# Patient Record
Sex: FEMALE | Race: WHITE | NOT HISPANIC OR LATINO | Employment: OTHER | URBAN - METROPOLITAN AREA
[De-identification: names, ages, dates, MRNs, and addresses within clinical notes are randomized per-mention and may not be internally consistent; named-entity substitution may affect disease eponyms.]

---

## 2017-01-01 ENCOUNTER — HOME CARE VISIT (OUTPATIENT)
Dept: HOME HEALTH SERVICES | Facility: HOME HEALTHCARE | Age: 82
End: 2017-01-01
Payer: MEDICARE

## 2017-01-01 ENCOUNTER — HOME HEALTH ADMISSION (OUTPATIENT)
Dept: HOME HEALTH SERVICES | Facility: HOME HEALTHCARE | Age: 82
End: 2017-01-01
Payer: MEDICARE

## 2017-01-01 ENCOUNTER — HOSPICE ADMISSION (OUTPATIENT)
Dept: HOSPICE | Facility: HOSPICE | Age: 82
End: 2017-01-01
Payer: MEDICARE

## 2017-01-01 ENCOUNTER — HOME CARE VISIT (OUTPATIENT)
Dept: HOSPICE | Facility: HOSPICE | Age: 82
End: 2017-01-01
Payer: MEDICARE

## 2017-01-01 ENCOUNTER — APPOINTMENT (OUTPATIENT)
Dept: RADIOLOGY | Facility: MEDICAL CENTER | Age: 82
DRG: 871 | End: 2017-01-01
Attending: INTERNAL MEDICINE
Payer: MEDICARE

## 2017-01-01 ENCOUNTER — HOSPITAL ENCOUNTER (INPATIENT)
Facility: MEDICAL CENTER | Age: 82
LOS: 1 days | DRG: 871 | End: 2017-07-22
Attending: EMERGENCY MEDICINE | Admitting: INTERNAL MEDICINE
Payer: MEDICARE

## 2017-01-01 ENCOUNTER — APPOINTMENT (OUTPATIENT)
Dept: RADIOLOGY | Facility: MEDICAL CENTER | Age: 82
End: 2017-01-01
Attending: EMERGENCY MEDICINE
Payer: MEDICARE

## 2017-01-01 ENCOUNTER — APPOINTMENT (OUTPATIENT)
Dept: RADIOLOGY | Facility: MEDICAL CENTER | Age: 82
DRG: 871 | End: 2017-01-01
Attending: EMERGENCY MEDICINE
Payer: MEDICARE

## 2017-01-01 ENCOUNTER — RESOLUTE PROFESSIONAL BILLING HOSPITAL PROF FEE (OUTPATIENT)
Dept: HOSPITALIST | Facility: MEDICAL CENTER | Age: 82
End: 2017-01-01
Payer: MEDICARE

## 2017-01-01 ENCOUNTER — HOSPITAL ENCOUNTER (OUTPATIENT)
Facility: MEDICAL CENTER | Age: 82
End: 2017-04-13
Attending: EMERGENCY MEDICINE | Admitting: INTERNAL MEDICINE
Payer: MEDICARE

## 2017-01-01 VITALS
RESPIRATION RATE: 18 BRPM | SYSTOLIC BLOOD PRESSURE: 100 MMHG | HEART RATE: 63 BPM | DIASTOLIC BLOOD PRESSURE: 68 MMHG | TEMPERATURE: 97.7 F

## 2017-01-01 VITALS
RESPIRATION RATE: 18 BRPM | TEMPERATURE: 97.9 F | HEART RATE: 65 BPM | SYSTOLIC BLOOD PRESSURE: 102 MMHG | DIASTOLIC BLOOD PRESSURE: 62 MMHG

## 2017-01-01 VITALS
DIASTOLIC BLOOD PRESSURE: 61 MMHG | HEART RATE: 65 BPM | SYSTOLIC BLOOD PRESSURE: 96 MMHG | TEMPERATURE: 208.4 F | RESPIRATION RATE: 18 BRPM

## 2017-01-01 VITALS
TEMPERATURE: 206.6 F | RESPIRATION RATE: 18 BRPM | DIASTOLIC BLOOD PRESSURE: 67 MMHG | HEART RATE: 66 BPM | SYSTOLIC BLOOD PRESSURE: 120 MMHG

## 2017-01-01 VITALS
TEMPERATURE: 98.2 F | DIASTOLIC BLOOD PRESSURE: 64 MMHG | HEART RATE: 64 BPM | RESPIRATION RATE: 20 BRPM | SYSTOLIC BLOOD PRESSURE: 118 MMHG

## 2017-01-01 VITALS
TEMPERATURE: 97.9 F | HEART RATE: 88 BPM | RESPIRATION RATE: 18 BRPM | SYSTOLIC BLOOD PRESSURE: 110 MMHG | DIASTOLIC BLOOD PRESSURE: 70 MMHG

## 2017-01-01 VITALS
HEIGHT: 62 IN | HEART RATE: 91 BPM | BODY MASS INDEX: 19.51 KG/M2 | OXYGEN SATURATION: 98 % | RESPIRATION RATE: 22 BRPM | TEMPERATURE: 98.2 F | SYSTOLIC BLOOD PRESSURE: 97 MMHG | WEIGHT: 106 LBS | DIASTOLIC BLOOD PRESSURE: 44 MMHG

## 2017-01-01 VITALS
HEART RATE: 67 BPM | RESPIRATION RATE: 18 BRPM | DIASTOLIC BLOOD PRESSURE: 70 MMHG | SYSTOLIC BLOOD PRESSURE: 105 MMHG | TEMPERATURE: 97.5 F

## 2017-01-01 VITALS
RESPIRATION RATE: 16 BRPM | HEART RATE: 61 BPM | DIASTOLIC BLOOD PRESSURE: 60 MMHG | SYSTOLIC BLOOD PRESSURE: 112 MMHG | TEMPERATURE: 97.5 F

## 2017-01-01 VITALS — TEMPERATURE: 209.8 F | HEART RATE: 70 BPM | RESPIRATION RATE: 16 BRPM

## 2017-01-01 VITALS
WEIGHT: 106.92 LBS | DIASTOLIC BLOOD PRESSURE: 39 MMHG | TEMPERATURE: 98.4 F | OXYGEN SATURATION: 96 % | SYSTOLIC BLOOD PRESSURE: 111 MMHG | HEIGHT: 62 IN | RESPIRATION RATE: 16 BRPM | HEART RATE: 66 BPM | BODY MASS INDEX: 19.68 KG/M2

## 2017-01-01 VITALS
SYSTOLIC BLOOD PRESSURE: 106 MMHG | RESPIRATION RATE: 16 BRPM | TEMPERATURE: 97.7 F | HEART RATE: 64 BPM | DIASTOLIC BLOOD PRESSURE: 62 MMHG

## 2017-01-01 VITALS
HEART RATE: 64 BPM | DIASTOLIC BLOOD PRESSURE: 65 MMHG | RESPIRATION RATE: 18 BRPM | SYSTOLIC BLOOD PRESSURE: 110 MMHG | TEMPERATURE: 97.5 F

## 2017-01-01 VITALS
HEART RATE: 64 BPM | SYSTOLIC BLOOD PRESSURE: 105 MMHG | TEMPERATURE: 97.7 F | RESPIRATION RATE: 18 BRPM | DIASTOLIC BLOOD PRESSURE: 65 MMHG

## 2017-01-01 VITALS
DIASTOLIC BLOOD PRESSURE: 60 MMHG | TEMPERATURE: 97.8 F | RESPIRATION RATE: 18 BRPM | HEART RATE: 94 BPM | SYSTOLIC BLOOD PRESSURE: 110 MMHG

## 2017-01-01 VITALS
RESPIRATION RATE: 16 BRPM | SYSTOLIC BLOOD PRESSURE: 106 MMHG | TEMPERATURE: 97.5 F | DIASTOLIC BLOOD PRESSURE: 60 MMHG | HEART RATE: 59 BPM

## 2017-01-01 VITALS
TEMPERATURE: 97.7 F | DIASTOLIC BLOOD PRESSURE: 70 MMHG | HEART RATE: 88 BPM | RESPIRATION RATE: 18 BRPM | SYSTOLIC BLOOD PRESSURE: 115 MMHG

## 2017-01-01 VITALS
DIASTOLIC BLOOD PRESSURE: 58 MMHG | SYSTOLIC BLOOD PRESSURE: 110 MMHG | HEIGHT: 62 IN | RESPIRATION RATE: 20 BRPM | TEMPERATURE: 98.3 F

## 2017-01-01 VITALS
RESPIRATION RATE: 16 BRPM | HEART RATE: 64 BPM | DIASTOLIC BLOOD PRESSURE: 60 MMHG | TEMPERATURE: 98.1 F | SYSTOLIC BLOOD PRESSURE: 110 MMHG

## 2017-01-01 VITALS
DIASTOLIC BLOOD PRESSURE: 70 MMHG | TEMPERATURE: 97.7 F | HEART RATE: 81 BPM | RESPIRATION RATE: 18 BRPM | SYSTOLIC BLOOD PRESSURE: 118 MMHG

## 2017-01-01 VITALS
RESPIRATION RATE: 18 BRPM | SYSTOLIC BLOOD PRESSURE: 120 MMHG | DIASTOLIC BLOOD PRESSURE: 65 MMHG | HEART RATE: 66 BPM | TEMPERATURE: 97.5 F

## 2017-01-01 DIAGNOSIS — R62.7 FAILURE TO THRIVE IN ADULT: ICD-10-CM

## 2017-01-01 DIAGNOSIS — R53.1 WEAKNESS: ICD-10-CM

## 2017-01-01 DIAGNOSIS — R05.9 COUGH: ICD-10-CM

## 2017-01-01 DIAGNOSIS — N28.9 RENAL INSUFFICIENCY: ICD-10-CM

## 2017-01-01 DIAGNOSIS — F03.C0 ADVANCED DEMENTIA (HCC): ICD-10-CM

## 2017-01-01 DIAGNOSIS — R09.02 HYPOXIA: ICD-10-CM
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
## 2017-01-01 DIAGNOSIS — R55 NEAR SYNCOPE: ICD-10-CM

## 2017-01-01 LAB
ALBUMIN SERPL BCP-MCNC: 3.1 G/DL (ref 3.2–4.9)
ALBUMIN SERPL BCP-MCNC: 4 G/DL (ref 3.2–4.9)
ALBUMIN/GLOB SERPL: 1.3 G/DL
ALBUMIN/GLOB SERPL: 1.3 G/DL
ALP SERPL-CCNC: 38 U/L (ref 30–99)
ALP SERPL-CCNC: 48 U/L (ref 30–99)
ALT SERPL-CCNC: 10 U/L (ref 2–50)
ALT SERPL-CCNC: 17 U/L (ref 2–50)
ANION GAP SERPL CALC-SCNC: 10 MMOL/L (ref 0–11.9)
ANION GAP SERPL CALC-SCNC: 14 MMOL/L (ref 0–11.9)
ANION GAP SERPL CALC-SCNC: 6 MMOL/L (ref 0–11.9)
ANION GAP SERPL CALC-SCNC: 7 MMOL/L (ref 0–11.9)
ANISOCYTOSIS BLD QL SMEAR: ABNORMAL
APPEARANCE UR: CLEAR
APTT PPP: 24 SEC (ref 24.7–36)
AST SERPL-CCNC: 16 U/L (ref 12–45)
AST SERPL-CCNC: 34 U/L (ref 12–45)
BASOPHILS # BLD AUTO: 0 % (ref 0–1.8)
BASOPHILS # BLD AUTO: 0.2 % (ref 0–1.8)
BASOPHILS # BLD AUTO: 0.8 % (ref 0–1.8)
BASOPHILS # BLD AUTO: 0.9 % (ref 0–1.8)
BASOPHILS # BLD: 0 K/UL (ref 0–0.12)
BASOPHILS # BLD: 0.03 K/UL (ref 0–0.12)
BASOPHILS # BLD: 0.06 K/UL (ref 0–0.12)
BASOPHILS # BLD: 0.07 K/UL (ref 0–0.12)
BILIRUB SERPL-MCNC: 0.6 MG/DL (ref 0.1–1.5)
BILIRUB SERPL-MCNC: 1.1 MG/DL (ref 0.1–1.5)
BILIRUB UR QL STRIP.AUTO: NEGATIVE
BNP SERPL-MCNC: 140 PG/ML (ref 0–100)
BUN SERPL-MCNC: 16 MG/DL (ref 8–22)
BUN SERPL-MCNC: 29 MG/DL (ref 8–22)
BUN SERPL-MCNC: 39 MG/DL (ref 8–22)
BUN SERPL-MCNC: 52 MG/DL (ref 8–22)
CALCIUM SERPL-MCNC: 7.5 MG/DL (ref 8.4–10.2)
CALCIUM SERPL-MCNC: 8.3 MG/DL (ref 8.4–10.2)
CALCIUM SERPL-MCNC: 8.8 MG/DL (ref 8.4–10.2)
CALCIUM SERPL-MCNC: 9.7 MG/DL (ref 8.4–10.2)
CHLORIDE SERPL-SCNC: 104 MMOL/L (ref 96–112)
CHLORIDE SERPL-SCNC: 110 MMOL/L (ref 96–112)
CHLORIDE SERPL-SCNC: 114 MMOL/L (ref 96–112)
CHLORIDE SERPL-SCNC: 116 MMOL/L (ref 96–112)
CO2 SERPL-SCNC: 22 MMOL/L (ref 20–33)
CO2 SERPL-SCNC: 26 MMOL/L (ref 20–33)
CO2 SERPL-SCNC: 27 MMOL/L (ref 20–33)
CO2 SERPL-SCNC: 29 MMOL/L (ref 20–33)
COLOR UR: YELLOW
CORTIS SERPL-MCNC: 16.8 UG/DL (ref 0–23)
CREAT SERPL-MCNC: 0.9 MG/DL (ref 0.5–1.4)
CREAT SERPL-MCNC: 1.23 MG/DL (ref 0.5–1.4)
CREAT SERPL-MCNC: 1.82 MG/DL (ref 0.5–1.4)
CREAT SERPL-MCNC: 2.37 MG/DL (ref 0.5–1.4)
CULTURE IF INDICATED INDCX: NO UA CULTURE
EKG IMPRESSION: NORMAL
EKG IMPRESSION: NORMAL
EOSINOPHIL # BLD AUTO: 0 K/UL (ref 0–0.51)
EOSINOPHIL # BLD AUTO: 0 K/UL (ref 0–0.51)
EOSINOPHIL # BLD AUTO: 0.23 K/UL (ref 0–0.51)
EOSINOPHIL # BLD AUTO: 0.24 K/UL (ref 0–0.51)
EOSINOPHIL NFR BLD: 0 % (ref 0–6.9)
EOSINOPHIL NFR BLD: 0 % (ref 0–6.9)
EOSINOPHIL NFR BLD: 2.8 % (ref 0–6.9)
EOSINOPHIL NFR BLD: 3.3 % (ref 0–6.9)
ERYTHROCYTE [DISTWIDTH] IN BLOOD BY AUTOMATED COUNT: 48 FL (ref 35.9–50)
ERYTHROCYTE [DISTWIDTH] IN BLOOD BY AUTOMATED COUNT: 49 FL (ref 35.9–50)
ERYTHROCYTE [DISTWIDTH] IN BLOOD BY AUTOMATED COUNT: 50.3 FL (ref 35.9–50)
ERYTHROCYTE [DISTWIDTH] IN BLOOD BY AUTOMATED COUNT: 52.3 FL (ref 35.9–50)
ERYTHROCYTE [SEDIMENTATION RATE] IN BLOOD BY WESTERGREN METHOD: 18 MM/HOUR (ref 0–30)
GFR SERPL CREATININE-BSD FRML MDRD: 19 ML/MIN/1.73 M 2
GFR SERPL CREATININE-BSD FRML MDRD: 26 ML/MIN/1.73 M 2
GFR SERPL CREATININE-BSD FRML MDRD: 41 ML/MIN/1.73 M 2
GFR SERPL CREATININE-BSD FRML MDRD: 58 ML/MIN/1.73 M 2
GLOBULIN SER CALC-MCNC: 2.3 G/DL (ref 1.9–3.5)
GLOBULIN SER CALC-MCNC: 3.1 G/DL (ref 1.9–3.5)
GLUCOSE BLD-MCNC: 92 MG/DL (ref 65–99)
GLUCOSE BLD-MCNC: 97 MG/DL (ref 65–99)
GLUCOSE SERPL-MCNC: 114 MG/DL (ref 65–99)
GLUCOSE SERPL-MCNC: 173 MG/DL (ref 65–99)
GLUCOSE SERPL-MCNC: 82 MG/DL (ref 65–99)
GLUCOSE SERPL-MCNC: 92 MG/DL (ref 65–99)
GLUCOSE UR STRIP.AUTO-MCNC: NEGATIVE MG/DL
GRAM STN SPEC: NORMAL
HCT VFR BLD AUTO: 36.4 % (ref 37–47)
HCT VFR BLD AUTO: 41.7 % (ref 37–47)
HCT VFR BLD AUTO: 42.4 % (ref 37–47)
HCT VFR BLD AUTO: 44.7 % (ref 37–47)
HGB BLD-MCNC: 11.2 G/DL (ref 12–16)
HGB BLD-MCNC: 12.9 G/DL (ref 12–16)
HGB BLD-MCNC: 13.3 G/DL (ref 12–16)
HGB BLD-MCNC: 14.4 G/DL (ref 12–16)
IMM GRANULOCYTES # BLD AUTO: 0.02 K/UL (ref 0–0.11)
IMM GRANULOCYTES # BLD AUTO: 0.03 K/UL (ref 0–0.11)
IMM GRANULOCYTES # BLD AUTO: 0.05 K/UL (ref 0–0.11)
IMM GRANULOCYTES NFR BLD AUTO: 0.3 % (ref 0–0.9)
IMM GRANULOCYTES NFR BLD AUTO: 0.3 % (ref 0–0.9)
IMM GRANULOCYTES NFR BLD AUTO: 0.4 % (ref 0–0.9)
INR PPP: 0.96 (ref 0.87–1.13)
KETONES UR STRIP.AUTO-MCNC: NEGATIVE MG/DL
LACTATE BLD-SCNC: 2.66 MMOL/L (ref 0.5–2)
LACTATE BLD-SCNC: 3.11 MMOL/L (ref 0.5–2)
LACTATE BLD-SCNC: 4.64 MMOL/L (ref 0.5–2)
LACTATE BLD-SCNC: 5.33 MMOL/L (ref 0.5–2)
LEUKOCYTE ESTERASE UR QL STRIP.AUTO: NEGATIVE
LG PLATELETS BLD QL SMEAR: NORMAL
LV EJECT FRACT  99904: 65
LV EJECT FRACT MOD 2C 99903: 58.2
LV EJECT FRACT MOD 4C 99902: 65.03
LV EJECT FRACT MOD BP 99901: 63.59
LYMPHOCYTES # BLD AUTO: 0.69 K/UL (ref 1–4.8)
LYMPHOCYTES # BLD AUTO: 0.8 K/UL (ref 1–4.8)
LYMPHOCYTES # BLD AUTO: 1.34 K/UL (ref 1–4.8)
LYMPHOCYTES # BLD AUTO: 1.48 K/UL (ref 1–4.8)
LYMPHOCYTES NFR BLD: 12 % (ref 22–41)
LYMPHOCYTES NFR BLD: 18.3 % (ref 22–41)
LYMPHOCYTES NFR BLD: 18.4 % (ref 22–41)
LYMPHOCYTES NFR BLD: 4.2 % (ref 22–41)
MACROCYTES BLD QL SMEAR: ABNORMAL
MAGNESIUM SERPL-MCNC: 1.8 MG/DL (ref 1.5–2.5)
MANUAL DIFF BLD: ABNORMAL
MCH RBC QN AUTO: 30.8 PG (ref 27–33)
MCH RBC QN AUTO: 31 PG (ref 27–33)
MCH RBC QN AUTO: 31.1 PG (ref 27–33)
MCH RBC QN AUTO: 31.1 PG (ref 27–33)
MCHC RBC AUTO-ENTMCNC: 30.8 G/DL (ref 33.6–35)
MCHC RBC AUTO-ENTMCNC: 30.9 G/DL (ref 33.6–35)
MCHC RBC AUTO-ENTMCNC: 31.4 G/DL (ref 33.6–35)
MCHC RBC AUTO-ENTMCNC: 32.2 G/DL (ref 33.6–35)
MCV RBC AUTO: 100.5 FL (ref 81.4–97.8)
MCV RBC AUTO: 100.8 FL (ref 81.4–97.8)
MCV RBC AUTO: 95.5 FL (ref 81.4–97.8)
MCV RBC AUTO: 99.3 FL (ref 81.4–97.8)
METAMYELOCYTES NFR BLD MANUAL: 9 %
MICRO URNS: NORMAL
MONOCYTES # BLD AUTO: 0.27 K/UL (ref 0–0.85)
MONOCYTES # BLD AUTO: 0.61 K/UL (ref 0–0.85)
MONOCYTES # BLD AUTO: 0.67 K/UL (ref 0–0.85)
MONOCYTES # BLD AUTO: 0.73 K/UL (ref 0–0.85)
MONOCYTES NFR BLD AUTO: 4 % (ref 0–13.4)
MONOCYTES NFR BLD AUTO: 4.4 % (ref 0–13.4)
MONOCYTES NFR BLD AUTO: 8.3 % (ref 0–13.4)
MONOCYTES NFR BLD AUTO: 8.4 % (ref 0–13.4)
NEUTROPHILS # BLD AUTO: 14.94 K/UL (ref 2–7.15)
NEUTROPHILS # BLD AUTO: 5.03 K/UL (ref 2–7.15)
NEUTROPHILS # BLD AUTO: 5.03 K/UL (ref 2–7.15)
NEUTROPHILS # BLD AUTO: 5.62 K/UL (ref 2–7.15)
NEUTROPHILS NFR BLD: 48 % (ref 44–72)
NEUTROPHILS NFR BLD: 68.8 % (ref 44–72)
NEUTROPHILS NFR BLD: 69.3 % (ref 44–72)
NEUTROPHILS NFR BLD: 90.9 % (ref 44–72)
NEUTS BAND NFR BLD MANUAL: 27 % (ref 0–10)
NITRITE UR QL STRIP.AUTO: NEGATIVE
NRBC # BLD AUTO: 0 K/UL
NRBC BLD AUTO-RTO: 0 /100 WBC
PH UR STRIP.AUTO: 6 [PH]
PLATELET # BLD AUTO: 155 K/UL (ref 164–446)
PLATELET # BLD AUTO: 186 K/UL (ref 164–446)
PLATELET # BLD AUTO: 256 K/UL (ref 164–446)
PLATELET # BLD AUTO: ABNORMAL K/UL (ref 164–446)
PLATELET BLD QL SMEAR: NORMAL
PMV BLD AUTO: 12.2 FL (ref 9–12.9)
PMV BLD AUTO: 12.7 FL (ref 9–12.9)
PMV BLD AUTO: 13.1 FL (ref 9–12.9)
PMV BLD AUTO: ABNORMAL FL (ref 9–12.9)
POTASSIUM SERPL-SCNC: 4 MMOL/L (ref 3.6–5.5)
POTASSIUM SERPL-SCNC: 4.1 MMOL/L (ref 3.6–5.5)
POTASSIUM SERPL-SCNC: 4.8 MMOL/L (ref 3.6–5.5)
POTASSIUM SERPL-SCNC: 4.9 MMOL/L (ref 3.6–5.5)
PROCALCITONIN SERPL-MCNC: 0.18 NG/ML
PROT SERPL-MCNC: 5.4 G/DL (ref 6–8.2)
PROT SERPL-MCNC: 7.1 G/DL (ref 6–8.2)
PROT UR QL STRIP: NEGATIVE MG/DL
PROTHROMBIN TIME: 12.6 SEC (ref 12–14.6)
RBC # BLD AUTO: 3.61 M/UL (ref 4.2–5.4)
RBC # BLD AUTO: 4.15 M/UL (ref 4.2–5.4)
RBC # BLD AUTO: 4.27 M/UL (ref 4.2–5.4)
RBC # BLD AUTO: 4.68 M/UL (ref 4.2–5.4)
RBC BLD AUTO: PRESENT
RBC UR QL AUTO: NEGATIVE
SIGNIFICANT IND 70042: NORMAL
SITE SITE: NORMAL
SODIUM SERPL-SCNC: 143 MMOL/L (ref 135–145)
SODIUM SERPL-SCNC: 144 MMOL/L (ref 135–145)
SODIUM SERPL-SCNC: 147 MMOL/L (ref 135–145)
SODIUM SERPL-SCNC: 151 MMOL/L (ref 135–145)
SOURCE SOURCE: NORMAL
SP GR UR STRIP.AUTO: 1.01
SPECIMEN DRAWN FROM PATIENT: ABNORMAL
TROPONIN I SERPL-MCNC: 0.04 NG/ML (ref 0–0.04)
TROPONIN I SERPL-MCNC: <0.02 NG/ML (ref 0–0.04)
TSH SERPL DL<=0.005 MIU/L-ACNC: 2.92 UIU/ML (ref 0.35–5.5)
VARIANT LYMPHS BLD QL SMEAR: NORMAL
WBC # BLD AUTO: 16.4 K/UL (ref 4.8–10.8)
WBC # BLD AUTO: 6.7 K/UL (ref 4.8–10.8)
WBC # BLD AUTO: 7.3 K/UL (ref 4.8–10.8)
WBC # BLD AUTO: 8.1 K/UL (ref 4.8–10.8)

## 2017-01-01 PROCEDURE — 665998 HH PPS REVENUE CREDIT

## 2017-01-01 PROCEDURE — 93005 ELECTROCARDIOGRAM TRACING: CPT | Performed by: EMERGENCY MEDICINE

## 2017-01-01 PROCEDURE — 700105 HCHG RX REV CODE 258: Performed by: INTERNAL MEDICINE

## 2017-01-01 PROCEDURE — G0152 HHCP-SERV OF OT,EA 15 MIN: HCPCS

## 2017-01-01 PROCEDURE — 93306 TTE W/DOPPLER COMPLETE: CPT | Mod: 26 | Performed by: INTERNAL MEDICINE

## 2017-01-01 PROCEDURE — 93005 ELECTROCARDIOGRAM TRACING: CPT | Performed by: INTERNAL MEDICINE

## 2017-01-01 PROCEDURE — A9270 NON-COVERED ITEM OR SERVICE: HCPCS | Performed by: INTERNAL MEDICINE

## 2017-01-01 PROCEDURE — 87040 BLOOD CULTURE FOR BACTERIA: CPT

## 2017-01-01 PROCEDURE — G0378 HOSPITAL OBSERVATION PER HR: HCPCS

## 2017-01-01 PROCEDURE — 700102 HCHG RX REV CODE 250 W/ 637 OVERRIDE(OP): Performed by: INTERNAL MEDICINE

## 2017-01-01 PROCEDURE — 665999 HH PPS REVENUE DEBIT

## 2017-01-01 PROCEDURE — 700102 HCHG RX REV CODE 250 W/ 637 OVERRIDE(OP): Performed by: HOSPITALIST

## 2017-01-01 PROCEDURE — 99291 CRITICAL CARE FIRST HOUR: CPT | Performed by: HOSPITALIST

## 2017-01-01 PROCEDURE — 97530 THERAPEUTIC ACTIVITIES: CPT

## 2017-01-01 PROCEDURE — 82962 GLUCOSE BLOOD TEST: CPT

## 2017-01-01 PROCEDURE — G0151 HHCP-SERV OF PT,EA 15 MIN: HCPCS

## 2017-01-01 PROCEDURE — 96367 TX/PROPH/DG ADDL SEQ IV INF: CPT

## 2017-01-01 PROCEDURE — 700105 HCHG RX REV CODE 258: Performed by: EMERGENCY MEDICINE

## 2017-01-01 PROCEDURE — 87070 CULTURE OTHR SPECIMN AEROBIC: CPT

## 2017-01-01 PROCEDURE — G0493 RN CARE EA 15 MIN HH/HOSPICE: HCPCS

## 2017-01-01 PROCEDURE — 97161 PT EVAL LOW COMPLEX 20 MIN: CPT

## 2017-01-01 PROCEDURE — G0299 HHS/HOSPICE OF RN EA 15 MIN: HCPCS

## 2017-01-01 PROCEDURE — 85007 BL SMEAR W/DIFF WBC COUNT: CPT

## 2017-01-01 PROCEDURE — 96365 THER/PROPH/DIAG IV INF INIT: CPT

## 2017-01-01 PROCEDURE — 99225 PR SUBSEQUENT OBSERVATION CARE,LEVEL II: CPT | Performed by: HOSPITALIST

## 2017-01-01 PROCEDURE — 85652 RBC SED RATE AUTOMATED: CPT

## 2017-01-01 PROCEDURE — 665997 HH PPS REVENUE ADJ

## 2017-01-01 PROCEDURE — 700111 HCHG RX REV CODE 636 W/ 250 OVERRIDE (IP): Performed by: INTERNAL MEDICINE

## 2017-01-01 PROCEDURE — 31720 CLEARANCE OF AIRWAYS: CPT

## 2017-01-01 PROCEDURE — 97535 SELF CARE MNGMENT TRAINING: CPT

## 2017-01-01 PROCEDURE — 71010 DX-CHEST-PORTABLE (1 VIEW): CPT

## 2017-01-01 PROCEDURE — 74176 CT ABD & PELVIS W/O CONTRAST: CPT

## 2017-01-01 PROCEDURE — 99220 PR INITIAL OBSERVATION CARE,LEVL III: CPT | Performed by: HOSPITALIST

## 2017-01-01 PROCEDURE — 36415 COLL VENOUS BLD VENIPUNCTURE: CPT

## 2017-01-01 PROCEDURE — 85027 COMPLETE CBC AUTOMATED: CPT

## 2017-01-01 PROCEDURE — A9270 NON-COVERED ITEM OR SERVICE: HCPCS | Performed by: HOSPITALIST

## 2017-01-01 PROCEDURE — G0160 HHC OCCUP THERAPY EA 15: HCPCS

## 2017-01-01 PROCEDURE — 70450 CT HEAD/BRAIN W/O DYE: CPT

## 2017-01-01 PROCEDURE — 94760 N-INVAS EAR/PLS OXIMETRY 1: CPT

## 2017-01-01 PROCEDURE — 96375 TX/PRO/DX INJ NEW DRUG ADDON: CPT

## 2017-01-01 PROCEDURE — 665001 SOC-HOME HEALTH

## 2017-01-01 PROCEDURE — 84484 ASSAY OF TROPONIN QUANT: CPT

## 2017-01-01 PROCEDURE — 99226 PR SUBSEQUENT OBSERVATION CARE,LEVEL III: CPT | Performed by: INTERNAL MEDICINE

## 2017-01-01 PROCEDURE — 85730 THROMBOPLASTIN TIME PARTIAL: CPT

## 2017-01-01 PROCEDURE — 83605 ASSAY OF LACTIC ACID: CPT | Mod: 91

## 2017-01-01 PROCEDURE — G8987 SELF CARE CURRENT STATUS: HCPCS | Mod: CM

## 2017-01-01 PROCEDURE — 304561 HCHG STAT O2

## 2017-01-01 PROCEDURE — 99285 EMERGENCY DEPT VISIT HI MDM: CPT

## 2017-01-01 PROCEDURE — G8979 MOBILITY GOAL STATUS: HCPCS | Mod: CK

## 2017-01-01 PROCEDURE — G8978 MOBILITY CURRENT STATUS: HCPCS | Mod: CL

## 2017-01-01 PROCEDURE — 85025 COMPLETE CBC W/AUTO DIFF WBC: CPT

## 2017-01-01 PROCEDURE — 85610 PROTHROMBIN TIME: CPT

## 2017-01-01 PROCEDURE — 700105 HCHG RX REV CODE 258: Performed by: HOSPITALIST

## 2017-01-01 PROCEDURE — 770020 HCHG ROOM/CARE - TELE (206)

## 2017-01-01 PROCEDURE — 83880 ASSAY OF NATRIURETIC PEPTIDE: CPT

## 2017-01-01 PROCEDURE — 94640 AIRWAY INHALATION TREATMENT: CPT

## 2017-01-01 PROCEDURE — 80053 COMPREHEN METABOLIC PANEL: CPT

## 2017-01-01 PROCEDURE — 80048 BASIC METABOLIC PNL TOTAL CA: CPT

## 2017-01-01 PROCEDURE — G8988 SELF CARE GOAL STATUS: HCPCS | Mod: CL

## 2017-01-01 PROCEDURE — 83735 ASSAY OF MAGNESIUM: CPT

## 2017-01-01 PROCEDURE — 700111 HCHG RX REV CODE 636 W/ 250 OVERRIDE (IP): Performed by: EMERGENCY MEDICINE

## 2017-01-01 PROCEDURE — 84145 PROCALCITONIN (PCT): CPT

## 2017-01-01 PROCEDURE — 93010 ELECTROCARDIOGRAM REPORT: CPT | Performed by: INTERNAL MEDICINE

## 2017-01-01 PROCEDURE — G0162 HHC RN E&M PLAN SVS, 15 MIN: HCPCS

## 2017-01-01 PROCEDURE — 99291 CRITICAL CARE FIRST HOUR: CPT | Performed by: INTERNAL MEDICINE

## 2017-01-01 PROCEDURE — 96361 HYDRATE IV INFUSION ADD-ON: CPT

## 2017-01-01 PROCEDURE — 97165 OT EVAL LOW COMPLEX 30 MIN: CPT

## 2017-01-01 PROCEDURE — 304538 HCHG NG TUBE

## 2017-01-01 PROCEDURE — 82533 TOTAL CORTISOL: CPT

## 2017-01-01 PROCEDURE — 87205 SMEAR GRAM STAIN: CPT

## 2017-01-01 PROCEDURE — 97112 NEUROMUSCULAR REEDUCATION: CPT

## 2017-01-01 PROCEDURE — 99217 PR OBSERVATION CARE DISCHARGE: CPT | Performed by: HOSPITALIST

## 2017-01-01 PROCEDURE — 304562 HCHG STAT O2 MASK/CANNULA

## 2017-01-01 PROCEDURE — 81003 URINALYSIS AUTO W/O SCOPE: CPT

## 2017-01-01 PROCEDURE — 700101 HCHG RX REV CODE 250: Performed by: INTERNAL MEDICINE

## 2017-01-01 PROCEDURE — 93306 TTE W/DOPPLER COMPLETE: CPT

## 2017-01-01 PROCEDURE — 84443 ASSAY THYROID STIM HORMONE: CPT

## 2017-01-01 RX ORDER — MORPHINE SULFATE 100 MG/5ML
10 SOLUTION ORAL
Status: DISCONTINUED | OUTPATIENT
Start: 2017-01-01 | End: 2017-01-01 | Stop reason: HOSPADM

## 2017-01-01 RX ORDER — BISACODYL 10 MG
10 SUPPOSITORY, RECTAL RECTAL
Status: DISCONTINUED | OUTPATIENT
Start: 2017-01-01 | End: 2017-01-01 | Stop reason: HOSPADM

## 2017-01-01 RX ORDER — SODIUM CHLORIDE 9 MG/ML
INJECTION, SOLUTION INTRAVENOUS CONTINUOUS
Status: ACTIVE | OUTPATIENT
Start: 2017-01-01 | End: 2017-01-01

## 2017-01-01 RX ORDER — MEMANTINE HYDROCHLORIDE 10 MG/1
20 TABLET ORAL 2 TIMES DAILY
Status: DISCONTINUED | OUTPATIENT
Start: 2017-01-01 | End: 2017-01-01 | Stop reason: HOSPADM

## 2017-01-01 RX ORDER — SODIUM CHLORIDE 9 MG/ML
INJECTION, SOLUTION INTRAVENOUS CONTINUOUS
Status: DISCONTINUED | OUTPATIENT
Start: 2017-01-01 | End: 2017-01-01

## 2017-01-01 RX ORDER — OMEPRAZOLE 20 MG/1
20 CAPSULE, DELAYED RELEASE ORAL DAILY
Status: DISCONTINUED | OUTPATIENT
Start: 2017-01-01 | End: 2017-01-01 | Stop reason: HOSPADM

## 2017-01-01 RX ORDER — MIDAZOLAM HYDROCHLORIDE 1 MG/ML
1 INJECTION INTRAMUSCULAR; INTRAVENOUS ONCE
Status: COMPLETED | OUTPATIENT
Start: 2017-01-01 | End: 2017-01-01

## 2017-01-01 RX ORDER — ONDANSETRON 4 MG/1
4 TABLET, ORALLY DISINTEGRATING ORAL EVERY 4 HOURS PRN
Status: DISCONTINUED | OUTPATIENT
Start: 2017-01-01 | End: 2017-01-01 | Stop reason: HOSPADM

## 2017-01-01 RX ORDER — LORAZEPAM 2 MG/ML
0.5 INJECTION INTRAMUSCULAR EVERY 6 HOURS PRN
Status: DISCONTINUED | OUTPATIENT
Start: 2017-01-01 | End: 2017-01-01

## 2017-01-01 RX ORDER — RISPERIDONE 1 MG/1
0.25 TABLET ORAL
Status: DISCONTINUED | OUTPATIENT
Start: 2017-01-01 | End: 2017-01-01 | Stop reason: HOSPADM

## 2017-01-01 RX ORDER — LORAZEPAM 2 MG/ML
0.5 INJECTION INTRAMUSCULAR EVERY 8 HOURS PRN
Status: DISCONTINUED | OUTPATIENT
Start: 2017-01-01 | End: 2017-01-01 | Stop reason: HOSPADM

## 2017-01-01 RX ORDER — PANTOPRAZOLE SODIUM 40 MG/10ML
40 INJECTION, POWDER, LYOPHILIZED, FOR SOLUTION INTRAVENOUS DAILY
Status: DISCONTINUED | OUTPATIENT
Start: 2017-01-01 | End: 2017-01-01

## 2017-01-01 RX ORDER — IPRATROPIUM BROMIDE AND ALBUTEROL SULFATE 2.5; .5 MG/3ML; MG/3ML
3 SOLUTION RESPIRATORY (INHALATION)
Status: DISCONTINUED | OUTPATIENT
Start: 2017-01-01 | End: 2017-01-01 | Stop reason: HOSPADM

## 2017-01-01 RX ORDER — POLYVINYL ALCOHOL 14 MG/ML
2 SOLUTION/ DROPS OPHTHALMIC EVERY 6 HOURS PRN
Status: DISCONTINUED | OUTPATIENT
Start: 2017-01-01 | End: 2017-01-01 | Stop reason: HOSPADM

## 2017-01-01 RX ORDER — SODIUM CHLORIDE 9 MG/ML
500 INJECTION, SOLUTION INTRAVENOUS
Status: COMPLETED | OUTPATIENT
Start: 2017-01-01 | End: 2017-01-01

## 2017-01-01 RX ORDER — MULTIVIT-MIN/IRON/FOLIC ACID/K 18-600-40
2000 CAPSULE ORAL DAILY
COMMUNITY

## 2017-01-01 RX ORDER — SODIUM CHLORIDE 9 MG/ML
1000 INJECTION, SOLUTION INTRAVENOUS ONCE
Status: COMPLETED | OUTPATIENT
Start: 2017-01-01 | End: 2017-01-01

## 2017-01-01 RX ORDER — MORPHINE SULFATE 4 MG/ML
2 INJECTION, SOLUTION INTRAMUSCULAR; INTRAVENOUS
Status: DISCONTINUED | OUTPATIENT
Start: 2017-01-01 | End: 2017-01-01 | Stop reason: HOSPADM

## 2017-01-01 RX ORDER — ASPIRIN 81 MG/1
81 TABLET, CHEWABLE ORAL EVERY EVENING
Status: DISCONTINUED | OUTPATIENT
Start: 2017-01-01 | End: 2017-01-01 | Stop reason: HOSPADM

## 2017-01-01 RX ORDER — MELOXICAM 7.5 MG/1
7.5 TABLET ORAL DAILY
Status: DISCONTINUED | OUTPATIENT
Start: 2017-01-01 | End: 2017-01-01 | Stop reason: HOSPADM

## 2017-01-01 RX ORDER — SODIUM CHLORIDE 9 MG/ML
30 INJECTION, SOLUTION INTRAVENOUS ONCE
Status: COMPLETED | OUTPATIENT
Start: 2017-01-01 | End: 2017-01-01

## 2017-01-01 RX ORDER — ONDANSETRON 2 MG/ML
4 INJECTION INTRAMUSCULAR; INTRAVENOUS EVERY 4 HOURS PRN
Status: DISCONTINUED | OUTPATIENT
Start: 2017-01-01 | End: 2017-01-01 | Stop reason: HOSPADM

## 2017-01-01 RX ORDER — ACETAMINOPHEN 500 MG
500 TABLET ORAL 2 TIMES DAILY
COMMUNITY

## 2017-01-01 RX ORDER — ONDANSETRON 2 MG/ML
4 INJECTION INTRAMUSCULAR; INTRAVENOUS EVERY 8 HOURS PRN
Status: DISCONTINUED | OUTPATIENT
Start: 2017-01-01 | End: 2017-01-01 | Stop reason: HOSPADM

## 2017-01-01 RX ORDER — RISPERIDONE 0.25 MG/1
0.25 TABLET ORAL
COMMUNITY
End: 2017-01-01

## 2017-01-01 RX ORDER — POLYETHYLENE GLYCOL 3350 17 G/17G
1 POWDER, FOR SOLUTION ORAL
Status: DISCONTINUED | OUTPATIENT
Start: 2017-01-01 | End: 2017-01-01 | Stop reason: HOSPADM

## 2017-01-01 RX ORDER — AMLODIPINE BESYLATE 5 MG/1
5 TABLET ORAL
Status: DISCONTINUED | OUTPATIENT
Start: 2017-01-01 | End: 2017-01-01 | Stop reason: HOSPADM

## 2017-01-01 RX ORDER — AMOXICILLIN 250 MG
2 CAPSULE ORAL 2 TIMES DAILY
Status: DISCONTINUED | OUTPATIENT
Start: 2017-01-01 | End: 2017-01-01 | Stop reason: HOSPADM

## 2017-01-01 RX ORDER — MIDAZOLAM HYDROCHLORIDE 5 MG/ML
INJECTION INTRAMUSCULAR; INTRAVENOUS
Status: COMPLETED
Start: 2017-01-01 | End: 2017-01-01

## 2017-01-01 RX ORDER — AMPICILLIN AND SULBACTAM 2; 1 G/1; G/1
3 INJECTION, POWDER, FOR SOLUTION INTRAMUSCULAR; INTRAVENOUS ONCE
Status: COMPLETED | OUTPATIENT
Start: 2017-01-01 | End: 2017-01-01

## 2017-01-01 RX ORDER — HEPARIN SODIUM 5000 [USP'U]/ML
5000 INJECTION, SOLUTION INTRAVENOUS; SUBCUTANEOUS EVERY 8 HOURS
Status: DISCONTINUED | OUTPATIENT
Start: 2017-01-01 | End: 2017-01-01 | Stop reason: HOSPADM

## 2017-01-01 RX ORDER — ONDANSETRON 2 MG/ML
8 INJECTION INTRAMUSCULAR; INTRAVENOUS EVERY 8 HOURS PRN
Status: DISCONTINUED | OUTPATIENT
Start: 2017-01-01 | End: 2017-01-01 | Stop reason: HOSPADM

## 2017-01-01 RX ORDER — ASCORBIC ACID 500 MG
1000 TABLET ORAL EVERY EVENING
Status: DISCONTINUED | OUTPATIENT
Start: 2017-01-01 | End: 2017-01-01 | Stop reason: HOSPADM

## 2017-01-01 RX ORDER — ONDANSETRON 4 MG/1
4 TABLET, ORALLY DISINTEGRATING ORAL EVERY 8 HOURS PRN
Status: DISCONTINUED | OUTPATIENT
Start: 2017-01-01 | End: 2017-01-01 | Stop reason: HOSPADM

## 2017-01-01 RX ORDER — MORPHINE SULFATE 10 MG/ML
5-10 INJECTION, SOLUTION INTRAMUSCULAR; INTRAVENOUS
Status: DISCONTINUED | OUTPATIENT
Start: 2017-01-01 | End: 2017-01-01 | Stop reason: HOSPADM

## 2017-01-01 RX ORDER — LORAZEPAM 0.5 MG/1
0.5 TABLET ORAL EVERY 6 HOURS PRN
Status: DISCONTINUED | OUTPATIENT
Start: 2017-01-01 | End: 2017-01-01

## 2017-01-01 RX ORDER — POLYVINYL ALCOHOL 14 MG/ML
1 SOLUTION/ DROPS OPHTHALMIC PRN
Status: DISCONTINUED | OUTPATIENT
Start: 2017-01-01 | End: 2017-01-01 | Stop reason: HOSPADM

## 2017-01-01 RX ORDER — OXYCODONE HYDROCHLORIDE 5 MG/1
2.5 TABLET ORAL
Status: DISCONTINUED | OUTPATIENT
Start: 2017-01-01 | End: 2017-01-01 | Stop reason: HOSPADM

## 2017-01-01 RX ORDER — GLYCOPYRROLATE 0.2 MG/ML
0.2 INJECTION INTRAMUSCULAR; INTRAVENOUS EVERY 4 HOURS PRN
Status: DISCONTINUED | OUTPATIENT
Start: 2017-01-01 | End: 2017-01-01 | Stop reason: HOSPADM

## 2017-01-01 RX ORDER — ONDANSETRON 4 MG/1
8 TABLET, ORALLY DISINTEGRATING ORAL EVERY 8 HOURS PRN
Status: DISCONTINUED | OUTPATIENT
Start: 2017-01-01 | End: 2017-01-01 | Stop reason: HOSPADM

## 2017-01-01 RX ORDER — HYDROXYZINE HYDROCHLORIDE 10 MG/1
5 TABLET, FILM COATED ORAL DAILY
COMMUNITY

## 2017-01-01 RX ORDER — OXYCODONE HYDROCHLORIDE 5 MG/1
5 TABLET ORAL
Status: DISCONTINUED | OUTPATIENT
Start: 2017-01-01 | End: 2017-01-01 | Stop reason: HOSPADM

## 2017-01-01 RX ORDER — ACETAMINOPHEN 325 MG/1
650 TABLET ORAL EVERY 6 HOURS PRN
Status: DISCONTINUED | OUTPATIENT
Start: 2017-01-01 | End: 2017-01-01 | Stop reason: HOSPADM

## 2017-01-01 RX ORDER — POLYVINYL ALCOHOL 14 MG/ML
1 SOLUTION/ DROPS OPHTHALMIC 4 TIMES DAILY
COMMUNITY

## 2017-01-01 RX ORDER — SODIUM CHLORIDE 450 MG/100ML
INJECTION, SOLUTION INTRAVENOUS CONTINUOUS
Status: DISCONTINUED | OUTPATIENT
Start: 2017-01-01 | End: 2017-01-01 | Stop reason: HOSPADM

## 2017-01-01 RX ADMIN — SODIUM CHLORIDE: 9 INJECTION, SOLUTION INTRAVENOUS at 19:10

## 2017-01-01 RX ADMIN — MEMANTINE HYDROCHLORIDE 20 MG: 10 TABLET ORAL at 19:55

## 2017-01-01 RX ADMIN — SODIUM CHLORIDE 500 ML: 9 INJECTION, SOLUTION INTRAVENOUS at 20:27

## 2017-01-01 RX ADMIN — AMLODIPINE BESYLATE 5 MG: 5 TABLET ORAL at 11:04

## 2017-01-01 RX ADMIN — RISPERIDONE 0.25 MG: 1 TABLET ORAL at 22:24

## 2017-01-01 RX ADMIN — LORAZEPAM 0.5 MG: 2 INJECTION INTRAMUSCULAR; INTRAVENOUS at 18:38

## 2017-01-01 RX ADMIN — SODIUM CHLORIDE 1000 ML: 4.5 INJECTION, SOLUTION INTRAVENOUS at 06:09

## 2017-01-01 RX ADMIN — CALCIUM CARBONATE-CHOLECALCIFEROL TAB 250 MG-125 UNIT 2 TABLET: 250-125 TAB at 08:55

## 2017-01-01 RX ADMIN — SODIUM CHLORIDE: 9 INJECTION, SOLUTION INTRAVENOUS at 05:11

## 2017-01-01 RX ADMIN — MEMANTINE HYDROCHLORIDE 20 MG: 10 TABLET ORAL at 09:20

## 2017-01-01 RX ADMIN — AMLODIPINE BESYLATE 5 MG: 5 TABLET ORAL at 17:53

## 2017-01-01 RX ADMIN — CALCIUM CARBONATE-CHOLECALCIFEROL TAB 250 MG-125 UNIT 2 TABLET: 250-125 TAB at 21:03

## 2017-01-01 RX ADMIN — AMLODIPINE BESYLATE 5 MG: 5 TABLET ORAL at 08:55

## 2017-01-01 RX ADMIN — SENNOSIDES AND DOCUSATE SODIUM 2 TABLET: 8.6; 5 TABLET ORAL at 08:44

## 2017-01-01 RX ADMIN — SODIUM CHLORIDE: 9 INJECTION, SOLUTION INTRAVENOUS at 09:42

## 2017-01-01 RX ADMIN — HEPARIN SODIUM 5000 UNITS: 5000 INJECTION, SOLUTION INTRAVENOUS; SUBCUTANEOUS at 15:59

## 2017-01-01 RX ADMIN — OXYCODONE HYDROCHLORIDE AND ACETAMINOPHEN 1000 MG: 500 TABLET ORAL at 21:46

## 2017-01-01 RX ADMIN — HEPARIN SODIUM 5000 UNITS: 5000 INJECTION, SOLUTION INTRAVENOUS; SUBCUTANEOUS at 06:14

## 2017-01-01 RX ADMIN — SENNOSIDES AND DOCUSATE SODIUM 2 TABLET: 8.6; 5 TABLET ORAL at 09:20

## 2017-01-01 RX ADMIN — CALCIUM CARBONATE-CHOLECALCIFEROL TAB 250 MG-125 UNIT 2 TABLET: 250-125 TAB at 08:24

## 2017-01-01 RX ADMIN — AMLODIPINE BESYLATE 5 MG: 5 TABLET ORAL at 09:28

## 2017-01-01 RX ADMIN — CEFTRIAXONE 2 G: 2 INJECTION, POWDER, FOR SOLUTION INTRAMUSCULAR; INTRAVENOUS at 15:48

## 2017-01-01 RX ADMIN — HEPARIN SODIUM 5000 UNITS: 5000 INJECTION, SOLUTION INTRAVENOUS; SUBCUTANEOUS at 21:48

## 2017-01-01 RX ADMIN — SODIUM CHLORIDE: 9 INJECTION, SOLUTION INTRAVENOUS at 06:29

## 2017-01-01 RX ADMIN — MEMANTINE HYDROCHLORIDE 20 MG: 10 TABLET ORAL at 08:55

## 2017-01-01 RX ADMIN — SENNOSIDES AND DOCUSATE SODIUM 2 TABLET: 8.6; 5 TABLET ORAL at 19:59

## 2017-01-01 RX ADMIN — SENNOSIDES AND DOCUSATE SODIUM 2 TABLET: 8.6; 5 TABLET ORAL at 22:22

## 2017-01-01 RX ADMIN — CALCIUM CARBONATE-CHOLECALCIFEROL TAB 250 MG-125 UNIT 2 TABLET: 250-125 TAB at 08:42

## 2017-01-01 RX ADMIN — MEMANTINE HYDROCHLORIDE 20 MG: 10 TABLET ORAL at 21:03

## 2017-01-01 RX ADMIN — CALCIUM CARBONATE-CHOLECALCIFEROL TAB 250 MG-125 UNIT 2 TABLET: 250-125 TAB at 19:54

## 2017-01-01 RX ADMIN — HEPARIN SODIUM 5000 UNITS: 5000 INJECTION, SOLUTION INTRAVENOUS; SUBCUTANEOUS at 06:28

## 2017-01-01 RX ADMIN — MELOXICAM 7.5 MG: 7.5 TABLET ORAL at 08:24

## 2017-01-01 RX ADMIN — VITAMIN D, TAB 1000IU (100/BT) 2000 UNITS: 25 TAB at 08:42

## 2017-01-01 RX ADMIN — ASPIRIN 81 MG CHEWABLE TABLET 81 MG: 81 TABLET CHEWABLE at 21:45

## 2017-01-01 RX ADMIN — MORPHINE SULFATE 10 MG: 100 SOLUTION ORAL at 08:48

## 2017-01-01 RX ADMIN — MEMANTINE HYDROCHLORIDE 20 MG: 10 TABLET ORAL at 21:47

## 2017-01-01 RX ADMIN — MEMANTINE HYDROCHLORIDE 20 MG: 10 TABLET ORAL at 22:24

## 2017-01-01 RX ADMIN — CALCIUM CARBONATE-CHOLECALCIFEROL TAB 250 MG-125 UNIT 2 TABLET: 250-125 TAB at 09:29

## 2017-01-01 RX ADMIN — MIDAZOLAM HYDROCHLORIDE 1 MG: 1 INJECTION, SOLUTION INTRAMUSCULAR; INTRAVENOUS at 11:31

## 2017-01-01 RX ADMIN — SENNOSIDES AND DOCUSATE SODIUM 2 TABLET: 8.6; 5 TABLET ORAL at 21:48

## 2017-01-01 RX ADMIN — SODIUM CHLORIDE: 9 INJECTION, SOLUTION INTRAVENOUS at 14:41

## 2017-01-01 RX ADMIN — ASPIRIN 81 MG CHEWABLE TABLET 81 MG: 81 TABLET CHEWABLE at 19:54

## 2017-01-01 RX ADMIN — ASPIRIN 81 MG CHEWABLE TABLET 81 MG: 81 TABLET CHEWABLE at 22:23

## 2017-01-01 RX ADMIN — ASPIRIN 81 MG CHEWABLE TABLET 81 MG: 81 TABLET CHEWABLE at 19:59

## 2017-01-01 RX ADMIN — SODIUM CHLORIDE: 9 INJECTION, SOLUTION INTRAVENOUS at 20:04

## 2017-01-01 RX ADMIN — CEFTRIAXONE 2 G: 2 INJECTION, POWDER, FOR SOLUTION INTRAMUSCULAR; INTRAVENOUS at 07:56

## 2017-01-01 RX ADMIN — MELOXICAM 7.5 MG: 7.5 TABLET ORAL at 09:21

## 2017-01-01 RX ADMIN — HEPARIN SODIUM 5000 UNITS: 5000 INJECTION, SOLUTION INTRAVENOUS; SUBCUTANEOUS at 21:03

## 2017-01-01 RX ADMIN — DEXTROSE MONOHYDRATE 500 MG: 50 INJECTION, SOLUTION INTRAVENOUS at 11:06

## 2017-01-01 RX ADMIN — VITAMIN D, TAB 1000IU (100/BT) 2000 UNITS: 25 TAB at 08:24

## 2017-01-01 RX ADMIN — ONDANSETRON 4 MG: 2 INJECTION INTRAMUSCULAR; INTRAVENOUS at 23:24

## 2017-01-01 RX ADMIN — SODIUM CHLORIDE: 9 INJECTION, SOLUTION INTRAVENOUS at 12:41

## 2017-01-01 RX ADMIN — MEMANTINE HYDROCHLORIDE 20 MG: 10 TABLET ORAL at 11:05

## 2017-01-01 RX ADMIN — HEPARIN SODIUM 5000 UNITS: 5000 INJECTION, SOLUTION INTRAVENOUS; SUBCUTANEOUS at 05:11

## 2017-01-01 RX ADMIN — MELOXICAM 7.5 MG: 7.5 TABLET ORAL at 08:41

## 2017-01-01 RX ADMIN — MELOXICAM 7.5 MG: 7.5 TABLET ORAL at 11:07

## 2017-01-01 RX ADMIN — RISPERIDONE 0.25 MG: 1 TABLET ORAL at 19:59

## 2017-01-01 RX ADMIN — SODIUM CHLORIDE: 9 INJECTION, SOLUTION INTRAVENOUS at 18:00

## 2017-01-01 RX ADMIN — CALCIUM CARBONATE-CHOLECALCIFEROL TAB 250 MG-125 UNIT 2 TABLET: 250-125 TAB at 19:59

## 2017-01-01 RX ADMIN — CALCIUM CARBONATE-CHOLECALCIFEROL TAB 250 MG-125 UNIT 1 TABLET: 250-125 TAB at 11:08

## 2017-01-01 RX ADMIN — HEPARIN SODIUM 5000 UNITS: 5000 INJECTION, SOLUTION INTRAVENOUS; SUBCUTANEOUS at 22:23

## 2017-01-01 RX ADMIN — SODIUM CHLORIDE 1000 ML: 9 INJECTION, SOLUTION INTRAVENOUS at 10:00

## 2017-01-01 RX ADMIN — OXYCODONE HYDROCHLORIDE AND ACETAMINOPHEN 1000 MG: 500 TABLET ORAL at 19:54

## 2017-01-01 RX ADMIN — HEPARIN SODIUM 5000 UNITS: 5000 INJECTION, SOLUTION INTRAVENOUS; SUBCUTANEOUS at 12:48

## 2017-01-01 RX ADMIN — HEPARIN SODIUM 5000 UNITS: 5000 INJECTION, SOLUTION INTRAVENOUS; SUBCUTANEOUS at 15:48

## 2017-01-01 RX ADMIN — MEMANTINE HYDROCHLORIDE 20 MG: 10 TABLET ORAL at 08:22

## 2017-01-01 RX ADMIN — MORPHINE SULFATE 2 MG: 4 INJECTION INTRAVENOUS at 23:24

## 2017-01-01 RX ADMIN — CALCIUM CARBONATE-CHOLECALCIFEROL TAB 250 MG-125 UNIT 2 TABLET: 250-125 TAB at 22:24

## 2017-01-01 RX ADMIN — MORPHINE SULFATE 2 MG: 4 INJECTION INTRAVENOUS at 04:21

## 2017-01-01 RX ADMIN — SODIUM CHLORIDE 1000 ML: 9 INJECTION, SOLUTION INTRAVENOUS at 15:23

## 2017-01-01 RX ADMIN — OXYCODONE HYDROCHLORIDE AND ACETAMINOPHEN 1000 MG: 500 TABLET ORAL at 21:03

## 2017-01-01 RX ADMIN — VITAMIN D, TAB 1000IU (100/BT) 2000 UNITS: 25 TAB at 08:54

## 2017-01-01 RX ADMIN — SODIUM CHLORIDE: 9 INJECTION, SOLUTION INTRAVENOUS at 20:00

## 2017-01-01 RX ADMIN — OXYCODONE HYDROCHLORIDE AND ACETAMINOPHEN 1000 MG: 500 TABLET ORAL at 19:59

## 2017-01-01 RX ADMIN — OXYCODONE HYDROCHLORIDE AND ACETAMINOPHEN 1000 MG: 500 TABLET ORAL at 22:23

## 2017-01-01 RX ADMIN — MEMANTINE HYDROCHLORIDE 20 MG: 10 TABLET ORAL at 08:41

## 2017-01-01 RX ADMIN — MELOXICAM 7.5 MG: 7.5 TABLET ORAL at 12:41

## 2017-01-01 RX ADMIN — RISPERIDONE 0.25 MG: 1 TABLET ORAL at 21:46

## 2017-01-01 RX ADMIN — MELOXICAM 7.5 MG: 7.5 TABLET ORAL at 08:55

## 2017-01-01 RX ADMIN — MEMANTINE HYDROCHLORIDE 20 MG: 10 TABLET ORAL at 19:59

## 2017-01-01 RX ADMIN — AMPICILLIN AND SULBACTAM 3 G: 2; 1 INJECTION, POWDER, FOR SOLUTION INTRAVENOUS at 10:00

## 2017-01-01 RX ADMIN — CALCIUM CARBONATE-CHOLECALCIFEROL TAB 250 MG-125 UNIT 2 TABLET: 250-125 TAB at 21:46

## 2017-01-01 RX ADMIN — VITAMIN D, TAB 1000IU (100/BT) 2000 UNITS: 25 TAB at 09:21

## 2017-01-01 RX ADMIN — VITAMIN D, TAB 1000IU (100/BT) 2000 UNITS: 25 TAB at 11:10

## 2017-01-01 RX ADMIN — HEPARIN SODIUM 5000 UNITS: 5000 INJECTION, SOLUTION INTRAVENOUS; SUBCUTANEOUS at 15:13

## 2017-01-01 RX ADMIN — IPRATROPIUM BROMIDE AND ALBUTEROL SULFATE 3 ML: .5; 3 SOLUTION RESPIRATORY (INHALATION) at 21:41

## 2017-01-01 RX ADMIN — RISPERIDONE 0.25 MG: 1 TABLET ORAL at 19:54

## 2017-01-01 RX ADMIN — HEPARIN SODIUM 5000 UNITS: 5000 INJECTION, SOLUTION INTRAVENOUS; SUBCUTANEOUS at 20:02

## 2017-01-01 RX ADMIN — ASPIRIN 81 MG CHEWABLE TABLET 81 MG: 81 TABLET CHEWABLE at 21:03

## 2017-01-01 RX ADMIN — RISPERIDONE 0.25 MG: 1 TABLET ORAL at 21:03

## 2017-01-01 SDOH — ECONOMIC STABILITY: HOUSING INSECURITY: UNSAFE APPLIANCES: 0

## 2017-01-01 SDOH — ECONOMIC STABILITY: HOUSING INSECURITY
HOME SAFETY: FIRE ESCAPE PLAN DEVELOPED. PATIENT DOES NOT HAVE FLAMMABLE MATERIALS PRESENT IN THE HOME PRESENTING A FIRE HAZARD. NO EVIDENCE FOUND OF SMOKING MATERIALS PRESENT IN THE HOME.

## 2017-01-01 SDOH — ECONOMIC STABILITY: HOUSING INSECURITY: UNSAFE COOKING RANGE AREA: 0

## 2017-01-01 SDOH — ECONOMIC STABILITY: HOUSING INSECURITY
HOME SAFETY: OXYGEN SAFETY RISK ASSESSMENT PERFORMED. PATIENT DOES HAVE A NO SMOKING SIGN POSTED IN THE HOME. PATIENT DOES HAVE A WORKING FIRE EXTINGUISHER PRESENT IN THE HOME. SMOKE ALARMS ARE PRESENT AND FUNCTIONAL ON EACH LEVEL OF THE HOME. PATIENT DOES HAVE A

## 2017-01-01 ASSESSMENT — ACTIVITIES OF DAILY LIVING (ADL)
HOME_HEALTH_OASIS: 01
PHYSICAL_TRANSFER_REQUIRES_ASSISTANCE: 1
DRESSING_UB_ASSISTANCE: 6
DRESSING_REQUIRES_ASSISTANCE: 1
DRESSING_UB_ASSISTANCE: 6
MEAL_PREP_ASSISTANCE: 6
EATING_ASSISTANCE: 1
TRANSPORTATION_ASSISTANCE: 6
AMBULATION_REQUIRES_ASSISTANCE: 1
OASIS_M1830: 03
HOME_HEALTH_OASIS: 01
TOILETING: REQUIRES ASSIST
ORAL_CARE_ASSISTANCE: 4
ORAL_CARE_ASSISTANCE: 6
OASIS_M1830: 06
GROOMING_ASSISTANCE: 6
EATING_ASSISTANCE: 0
LAUNDRY_ASSISTANCE: 6
TOILETING_ASSISTANCE: 6
HOUSEKEEPING_ASSISTANCE: 6
GROOMING_ASSISTANCE: 5
BATHING_REQUIRES_ASSISTANCE: 1
TELEPHONE_ASSISTANCE: 6
SHOPPING_ASSISTANCE: 6
DRESSING_LB_ASSISTANCE: 6
TOILETING_ASSISTANCE: 6
BATHING_ASSISTANCE: 6
BATHING_ASSISTANCE: 6
DRESSING_LB_ASSISTANCE: 6

## 2017-01-01 ASSESSMENT — GAIT ASSESSMENTS
GAIT LEVEL OF ASSIST: MODERATE ASSIST
DISTANCE (FEET): 4
ASSISTIVE DEVICE: FRONT WHEEL WALKER
ASSISTIVE DEVICE: FRONT WHEEL WALKER
DEVIATION: BRADYKINETIC
GAIT LEVEL OF ASSIST: MODERATE ASSIST
DEVIATION: BRADYKINETIC
DISTANCE (FEET): 4
ASSISTIVE DEVICE: FRONT WHEEL WALKER
GAIT LEVEL OF ASSIST: CONTACT GUARD ASSIST
DISTANCE (FEET): 4
ASSISTIVE DEVICE: FRONT WHEEL WALKER
DEVIATION: BRADYKINETIC
DISTANCE (FEET): 2

## 2017-01-01 ASSESSMENT — ENCOUNTER SYMPTOMS
HALLUCINATIONS: 0
POOR JUDGMENT: 1
VOMITING: DENIES
ABDOMINAL PAIN: 0
SHORTNESS OF BREATH: 0
BACK PAIN: 0
POOR JUDGMENT: 1
SHORTNESS OF BREATH: 0
HEADACHES: 0
DIFFICULTY THINKING: 1
HEADACHES: 0
HEADACHES: 0
DIFFICULTY THINKING: 1
DIZZINESS: 0
DIFFICULTY THINKING: 1
AGORAPHOBIA: 1
HEARTBURN: 0
POOR JUDGMENT: 1
DIFFICULTY THINKING: 1
AGORAPHOBIA: 1
NAUSEA: 0
DEPRESSION: 0
EYE DISCHARGE: 0
FEVER: 0
TREMORS: 0
DIFFICULTY THINKING: 1
ABDOMINAL PAIN: 1
DIFFICULTY THINKING: 1
VOMITING: NO
AGORAPHOBIA: 1
VOMITING: 1
WEAKNESS: 1
POOR JUDGMENT: 1
POOR JUDGMENT: 1
BACK PAIN: 0
SORE THROAT: 0
DIFFICULTY THINKING: 1
SHORTNESS OF BREATH: T
FEVER: 0
SPEECH CHANGE: 0
MYALGIAS: 0
DIZZINESS: 0
DIFFICULTY THINKING: 1
SPEECH CHANGE: 0
SHORTNESS OF BREATH: 0
ADDITIONAL INFORMATION: GENERALIZED PAIN
DIAPHORESIS: 0
POOR JUDGMENT: 1
FOCAL WEAKNESS: 0
NAUSEA: 0
NERVOUS/ANXIOUS: 0
NAUSEA: DENIES
POOR JUDGMENT: 1
POOR JUDGMENT: 1
BLOOD IN STOOL: 0
POOR JUDGMENT: 1
POOR JUDGMENT: 1
DIFFICULTY THINKING: 1
DIFFICULTY THINKING: 1
RESPIRATORY SYMPTOMS COMMENTS: NO RESP. DISTRESS NOTED
DIFFICULTY THINKING: 1
ABDOMINAL PAIN: 0
DIFFICULTY THINKING: 1
POOR JUDGMENT: 1
WEAKNESS: 1
POOR JUDGMENT: 1
PALPITATIONS: 0
WEAKNESS: 1
HALLUCINATIONS: 0
VOMITING: DECLINES
POOR JUDGMENT: 1
DIFFICULTY THINKING: 1
DIFFICULTY THINKING: 1
CHILLS: 0
NAUSEA: NO
NAUSEA: DECLINES
NERVOUS/ANXIOUS: 0
COUGH: 1
NAUSEA: 1
DIARRHEA: 0

## 2017-01-01 ASSESSMENT — PATIENT HEALTH QUESTIONNAIRE - PHQ9
1. LITTLE INTEREST OR PLEASURE IN DOING THINGS: NOT AT ALL
SUM OF ALL RESPONSES TO PHQ9 QUESTIONS 1 AND 2: 0
2. FEELING DOWN, DEPRESSED, IRRITABLE, OR HOPELESS: 00
SUM OF ALL RESPONSES TO PHQ QUESTIONS 1-9: 0
1. LITTLE INTEREST OR PLEASURE IN DOING THINGS: 00
2. FEELING DOWN, DEPRESSED, IRRITABLE, OR HOPELESS: NOT AT ALL

## 2017-01-01 ASSESSMENT — PAIN SCALES - GENERAL
PAINLEVEL_OUTOF10: 0
PAINLEVEL_OUTOF10: 7

## 2017-01-01 ASSESSMENT — LIFESTYLE VARIABLES
EVER_SMOKED: NEVER
ALCOHOL_USE: NO
EVER_SMOKED: UNABLE TO EVALUATE AT THIS TIME - NEEDS ASSESSMENT PRIOR TO DISCHARGE
ALCOHOL_USE: NO

## 2017-04-08 PROBLEM — R53.1 WEAKNESS: Status: ACTIVE | Noted: 2017-01-01

## 2017-04-08 PROBLEM — R55 NEAR SYNCOPE: Status: ACTIVE | Noted: 2017-01-01

## 2017-04-08 NOTE — ED NOTES
Pt's family arrived at bedside and reported that she has not had her morning meds, everything was taken up through last night.

## 2017-04-08 NOTE — ED NOTES
Early this am when this pt was walking with her walker in her room she experienced a syncopal episode. She was assisted slowly to the floor, no trauma. Alert now, BIB [facility]. DNR per family.

## 2017-04-08 NOTE — IP AVS SNAPSHOT
" Home Care Instructions                                                                                                                  Name:Cely De La Rosa Clinton  Medical Record Number:[id number]  CSN: [id number]    YOB: 1924   Age: 89 y.o.  Sex: female  HT:1.575 m (5' 2\") WT: 48.5 kg (106 lb 14.8 oz)          Admit Date: 4/8/2017     Discharge Date:   Today's Date: 4/13/2017  Attending Doctor:  Yunior Justin D.O.                  Allergies:  Review of patient's allergies indicates no known allergies.            Discharge Instructions       Discharge Instructions    Discharged to home by medical transportation with relative. Discharged via wheelchair, hospital escort: Yes.  Special equipment needed: Not Applicable    Be sure to schedule a follow-up appointment with your primary care doctor or any specialists as instructed.     Discharge Plan:   Diet Plan: Discussed  Activity Level: Discussed  Confirmed Follow up Appointment: Patient to Call and Schedule Appointment  Confirmed Symptoms Management: Discussed  Medication Reconciliation Updated: Yes  Influenza Vaccine Indication: Not indicated: Previously immunized this influenza season and > 8 years of age    I understand that a diet low in cholesterol, fat, and sodium is recommended for good health. Unless I have been given specific instructions below for another diet, I accept this instruction as my diet prescription.   Other diet: as tolerated    Special Instructions: None    · Is patient discharged on Warfarin / Coumadin?   No     · Is patient Post Blood Transfusion?  No  Near-Syncope  Near-syncope (commonly known as near fainting) is sudden weakness, dizziness, or feeling like you might pass out. During an episode of near-syncope, you may also develop pale skin, have tunnel vision, or feel sick to your stomach (nauseous). Near-syncope may occur when getting up after sitting or while standing for a long time. It is caused by a sudden decrease in blood " flow to the brain. This decrease can result from various causes or triggers, most of which are not serious. However, because near-syncope can sometimes be a sign of something serious, a medical evaluation is required. The specific cause is often not determined.  HOME CARE INSTRUCTIONS   Monitor your condition for any changes. The following actions may help to alleviate any discomfort you are experiencing:  · Have someone stay with you until you feel stable.  · Lie down right away and prop your feet up if you start feeling like you might faint. Breathe deeply and steadily. Wait until all the symptoms have passed. Most of these episodes last only a few minutes. You may feel tired for several hours.    · Drink enough fluids to keep your urine clear or pale yellow.    · If you are taking blood pressure or heart medicine, get up slowly when seated or lying down. Take several minutes to sit and then stand. This can reduce dizziness.  · Follow up with your health care provider as directed.   SEEK IMMEDIATE MEDICAL CARE IF:   · You have a severe headache.    · You have unusual pain in the chest, abdomen, or back.    · You are bleeding from the mouth or rectum, or you have black or tarry stool.    · You have an irregular or very fast heartbeat.    · You have repeated fainting or have seizure-like jerking during an episode.    · You faint when sitting or lying down.    · You have confusion.    · You have difficulty walking.    · You have severe weakness.    · You have vision problems.    MAKE SURE YOU:   · Understand these instructions.  · Will watch your condition.  · Will get help right away if you are not doing well or get worse.     This information is not intended to replace advice given to you by your health care provider. Make sure you discuss any questions you have with your health care provider.     Document Released: 12/18/2006 Document Revised: 12/23/2014 Document Reviewed: 05/23/2014  "[employer] Interactive Patient  Education ©2016 [employer].      Depression / Suicide Risk    As you are discharged from this [facility] facility, it is important to learn how to keep safe from harming yourself.    Recognize the warning signs:  · Abrupt changes in personality, positive or negative- including increase in energy   · Giving away possessions  · Change in eating patterns- significant weight changes-  positive or negative  · Change in sleeping patterns- unable to sleep or sleeping all the time   · Unwillingness or inability to communicate  · Depression  · Unusual sadness, discouragement and loneliness  · Talk of wanting to die  · Neglect of personal appearance   · Rebelliousness- reckless behavior  · Withdrawal from people/activities they love  · Confusion- inability to concentrate     If you or a loved one observes any of these behaviors or has concerns about self-harm, here's what you can do:  · Talk about it- your feelings and reasons for harming yourself  · Remove any means that you might use to hurt yourself (examples: pills, rope, extension cords, firearm)  · Get professional help from the community (Mental Health, Substance Abuse, psychological counseling)  · Do not be alone:Call your Safe Contact- someone whom you trust who will be there for you.  · Call your local CRISIS HOTLINE 862-1991 or 195-043-1072  · Call your local Children's Mobile Crisis Response Team [place] (916) 937-7888 or www.RunRev  · Call the toll free National Suicide Prevention Hotlines   · National Suicide Prevention Lifeline 919-022-YYCJ (8487)  · National Hope Line Network 800-SUICIDE (354-7771)        Follow-up Information     1. Follow up with Aimee Zendejas PA-C. Go on 4/27/2017.    Specialty:  Family Medicine    Why:  Follow Up, per patients daughter existing appointment on 4/27 with PCP    Contact information    [address]  [place] [address]  [phone]           Discharge Medication Instructions:    Below are the  medications your physician expects you to take upon discharge:    Review all your home medications and newly ordered medications with your doctor and/or pharmacist. Follow medication instructions as directed by your doctor and/or pharmacist.    Please keep your medication list with you and share with your physician.               Medication List      CHANGE how you take these medications        Instructions    Morning Afternoon Evening Bedtime    Mirabegron ER 25 MG Tb24   What changed:    - medication strength  - how much to take  - when to take this   Last time this was given:  1 Tab on 4/10/2017  6:00 PM        Take 25 mg by mouth every day.   Dose:  25 mg                          CONTINUE taking these medications        Instructions    Morning Afternoon Evening Bedtime    artificial tears 1.4 % Soln        Place 1 Drop in both eyes as needed.   Dose:  1 Drop                        ascorbic acid 1000 MG tablet   Last time this was given:  1,000 mg on 4/12/2017  9:03 PM   Commonly known as:  VITAMIN C        Take 1,000 mg by mouth every evening.   Dose:  1000 mg                        aspirin 81 MG tablet        Take 81 mg by mouth every evening.   Dose:  81 mg                        Calcium Citrate-Vitamin D 315-250 MG-UNIT Tabs        Take 2 Tabs by mouth 2 Times a Day.   Dose:  2 Tab                        D3 SUPER STRENGTH 2000 UNIT Caps   Generic drug:  Cholecalciferol        Take 2,000 Units by mouth every day at 6 PM.   Dose:  2000 Units                        meloxicam 7.5 MG Tabs   Last time this was given:  7.5 mg on 4/13/2017 11:07 AM   Commonly known as:  MOBIC        Take 7.5 mg by mouth every day.   Dose:  7.5 mg                        mupirocin 2 % Oint   Commonly known as:  BACTROBAN        Apply 2 % to affected area(s) every day. Applied by home health to ulcer RIGHT foot   Dose:  2 %                        NAMENDA XR 28 MG Cp24   Generic drug:  Memantine HCl ER        Take 28 mg by mouth every  day at 6 PM.   Dose:  28 mg                        MURPHY MILK OF MAGNESIA 311 MG Chew   Generic drug:  Magnesium Hydroxide        Take 4 Tabs by mouth 2 times a day as needed (constipation).   Dose:  4 Tab                        risperidone 0.25 MG Tabs   Last time this was given:  0.25 mg on 4/12/2017  9:03 PM   Commonly known as:  RISPERDAL        Take 0.25 mg by mouth every bedtime.   Dose:  0.25 mg                        TYLENOL 500 MG Tabs   Generic drug:  acetaminophen        Take 500 mg by mouth 2 Times a Day.   Dose:  500 mg                             Where to Get Your Medications      You can get these medications from any pharmacy     Bring a paper prescription for each of these medications    - Mirabegron ER 25 MG Tb24            Orders for after discharge     REFERRAL TO HOME HEALTH    Complete by:  As directed    Home health will create and establish a plan of care       REFERRAL TO HOSPICE    Complete by:  As directed        REFERRAL TO SKILLED NURSING FACILITY    Complete by:  As directed              Instructions           Diet / Nutrition:    Follow any diet instructions given to you by your doctor or the dietician, including how much salt (sodium) you are allowed each day.    If you are overweight, talk to your doctor about a weight reduction plan.    Activity:    Remain physically active following your doctor's instructions about exercise and activity.    Rest often.     Any time you become even a little tired or short of breath, SIT DOWN and rest.    Worsening Symptoms:    Report any of the following signs and symptoms to the doctor's office immediately:    *Pain of jaw, arm, or neck  *Chest pain not relieved by medication                               *Dizziness or loss of consciousness  *Difficulty breathing even when at rest   *More tired than usual                                       *Bleeding drainage or swelling of surgical site  *Swelling of feet, ankles, legs or stomach                  *Fever (>100ºF)  *Pink or blood tinged sputum  *Weight gain (3lbs/day or 5lbs /week)           *Shock from internal defibrillator (if applicable)  *Palpitations or irregular heartbeats                *Cool and/or numb extremities    Stroke Awareness    Common Risk Factors for Stroke include:    Age  Atrial Fibrillation  Carotid Artery Stenosis  Diabetes Mellitus  Excessive alcohol consumption  High blood pressure  Overweight   Physical inactivity  Smoking    Warning signs and symptoms of a stroke include:    *Sudden numbness or weakness of the face, arm or leg (especially on one side of the body).  *Sudden confusion, trouble speaking or understanding.  *Sudden trouble seeing in one or both eyes.  *Sudden trouble walking, dizziness, loss of balance or coordination.Sudden severe headache with no known cause.    It is very important to get treatment quickly when a stroke occurs. If you experience any of the above warning signs, call 911 immediately.                   Disclaimer         Quit Smoking / Tobacco Use:    I understand the use of any tobacco products increases my chance of suffering from future heart disease or stroke and could cause other illnesses which may shorten my life. Quitting the use of tobacco products is the single most important thing I can do to improve my health. For further information on smoking / tobacco cessation call a Toll Free Quit Line at 1-497.351.5171 (*National Cancer [place]) or 1-778.266.9267 (American Lung Association) or you can access the web based program at www.lungusa.org.    [place] Tobacco Users Help Line:  (854) 590-4087       Toll Free: 1-398.116.1469  Quit Tobacco Program [facility] (875)[phone]    Crisis Hotline:    [place] Crisis Hotline:  7-406-KRXFFIQ or 1-358.253.1514    [place] Crisis Hotline:    1-334.934.6599 or 879-908-6564    Discharge Survey:   Thank you for choosing [facility]. We hope we did everything we could to make your  hospital stay a pleasant one. You may be receiving a phone survey and we would appreciate your time and participation in answering the questions. Your input is very valuable to us in our efforts to improve our service to our patients and their families.        My signature on this form indicates that:    1. I have reviewed and understand the above information.  2. My questions regarding this information have been answered to my satisfaction.  3. I have formulated a plan with my discharge nurse to obtain my prescribed medications for home.                  Disclaimer         __________________________________                     __________       ________                       Patient Signature                                                 Date                    Time

## 2017-04-08 NOTE — PROGRESS NOTES
Telemetry Summary    Rhythm Interpretation: Sinus Rhythm  Heart Rate: 65  MS Interval: 0.16  QRS Interval: 0.08  QT Interval: 0.40

## 2017-04-08 NOTE — ED NOTES
The Medication Reconciliation process has been completed by looking at the patients med list.  Will attempt to call Hunter Sutton to find out if she had her meds this am.

## 2017-04-08 NOTE — IP AVS SNAPSHOT
Stylecrook Access Code: [email or web address]  Expires: 5/13/2017 12:54 PM    Your email address is not on file at [facility].  Email Addresses are required for you to sign up for Stylecrook, please contact [phone] to verify your personal information and to provide your email address prior to attempting to register for Stylecrook.    Cely Knowles  [address] [place] [address]    Stylecrook  A secure, online tool to manage your health information     [facility]’s Stylecrook® is a secure, online tool that connects you to your personalized health information from the privacy of your home -- day or night - making it very easy for you to manage your healthcare. Once the activation process is completed, you can even access your medical information using the Stylecrook zaira, which is available for free in the Apple Zaira store or Google Play store.     To learn more about Stylecrook, visit [email or web address]    There are two levels of access available (as shown below):   My Chart Features  [facility] Primary Care Doctor [facility]  Specialists [facility]  Urgent  Care Non-[facility] Primary Care Doctor   Email your healthcare team securely and privately 24/7 X X X    Manage appointments: schedule your next appointment; view details of past/upcoming appointments X      Request prescription refills. X      View recent personal medical records, including lab and immunizations X X X X   View health record, including health history, allergies, medications X X X X   Read reports about your outpatient visits, procedures, consult and ER notes X X X X   See your discharge summary, which is a recap of your hospital and/or ER visit that includes your diagnosis, lab results, and care plan X X  X     How to register for Stylecrook:  Once your e-mail address has been verified, follow the following steps to sign up for Stylecrook.     1. Go to  [email or web address]  2. Click on the Sign Up Now box, which takes you to the New Member Sign Up page.  You will need to provide the following information:  a. Enter your Mobile [employer] Apps Access Code exactly as it appears at the top of this page. (You will not need to use this code after you’ve completed the sign-up process. If you do not sign up before the expiration date, you must request a new code.)   b. Enter your date of birth.   c. Enter your home email address.   d. Click Submit, and follow the next screen’s instructions.  3. Create a VSHOREt ID. This will be your Mobile [employer] Apps login ID and cannot be changed, so think of one that is secure and easy to remember.  4. Create a [employer] Apps password. You can change your password at any time.  5. Enter your Password Reset Question and Answer. This can be used at a later time if you forget your password.   6. Enter your e-mail address. This allows you to receive e-mail notifications when new information is available in Mobile [employer] Apps.  7. Click Sign Up. You can now view your health information.    For assistance activating your Mobile [employer] Apps account, call [phone]

## 2017-04-08 NOTE — IP AVS SNAPSHOT
4/13/2017    Cely De La Rosa Benson  [address] [place] [address]    Dear Cely:    [facility] wants to ensure your discharge home is safe and you or your loved ones have had all of your questions answered regarding your care after you leave the hospital.    Below is a list of resources and contact information should you have any questions regarding your hospital stay, follow-up instructions, or active medical symptoms.    Questions or Concerns Regarding… Contact   Medical Questions Related to Your Discharge  (7 days a week, 8am-5pm) Contact a Nurse Care Coordinator   [phone]   Medical Questions Not Related to Your Discharge  (24 hours a day / 7 days a week)  Contact the Nurse Health Line   [phone]    Medications or Discharge Instructions Refer to your discharge packet   or contact your [facility] [place] [facility] Primary Care Provider   [phone]   Follow-up Appointment(s) Schedule your appointment via [employer]x   or contact Scheduling [phone]   Billing Review your statement via [employer]x  or contact Billing [phone]   Medical Records Review your records via En[employer]x   or contact Medical Records [phone]     You may receive a telephone call within two days of discharge. This call is to make certain you understand your discharge instructions and have the opportunity to have any questions answered. You can also easily access your medical information, test results and upcoming appointments via the Enovex free online health management tool. You can learn more and sign up at [employer]/Enovex. For assistance setting up your Enovex account, please call [phone].    Once again, we want to ensure your discharge home is safe and that you have a clear understanding of any next steps in your care. If you have any questions or concerns, please do not hesitate to contact us, we are here for you. Thank you for choosing [facility] for your healthcare needs.    Sincerely,    Your [facility] Healthcare Team

## 2017-04-08 NOTE — ED PROVIDER NOTES
ED Provider Note    CHIEF COMPLAINT  Chief Complaint   Patient presents with   • Syncope       HPI  Cely Knowles is a 89 y.o. female who presents from [facility] after becoming weak and almost passing out this morning. The patient was walking with her walker and became suddenly weak and was helped to the ground. She states that she felt dizzy. She denies any headache or vision changes. She denies any sore throat. She denies any coughing or shortness of breath or chest pain. She has no abdominal pain, vomiting or diarrhea. The patient has a history of dementia and lives at [facility]. She is a DNR. She is incontinent and uses a walker to walk, but according to her daughter, she has become weaker over the last several months. The patient told her daughter that she doesn't want to be well or here anymore.    REVIEW OF SYSTEMS  See HPI for further details. The patient has dementia which makes her history and review of systems Limited. She currently complains of dizziness and generalized weakness but has no pain All other systems are negative.   PAST MEDICAL HISTORY  No past medical history on file.    FAMILY HISTORY  No family history on file.    SOCIAL HISTORY  Social History     Social History   • Marital Status:      Spouse Name: N/A   • Number of Children: N/A   • Years of Education: N/A     Social History Main Topics   • Smoking status: Not on file   • Smokeless tobacco: Not on file   • Alcohol Use: Not on file   • Drug Use: Not on file   • Sexual Activity: Not on file     Other Topics Concern   • Not on file     Social History Narrative   • No narrative on file       SURGICAL HISTORY  No past surgical history on file.    CURRENT MEDICATIONS  Home Medications     Reviewed by Yudith Ceballos (Pharmacy Tech) on 04/08/17 at 0945  Med List Status: Complete    Medication Last Dose Status    acetaminophen (TYLENOL) 500 MG Tab  Active    artificial tears 1.4 % Solution  Active    ascorbic  "acid (VITAMIN C) 1000 MG tablet  Active    aspirin 81 MG tablet  Active    Calcium Citrate-Vitamin D 315-250 MG-UNIT Tab  Active    Cholecalciferol (D3 SUPER STRENGTH) 2000 UNIT Cap  Active    Magnesium Hydroxide (MURPHY MILK OF MAGNESIA) 311 MG Chew Tab  Active    meloxicam (MOBIC) 7.5 MG Tab  Active    Memantine HCl ER (NAMENDA XR) 28 MG CAPSULE SR 24 HR  Active    Mirabegron ER (MYRBETRIQ) 50 MG TABLET SR 24 HR  Active    mupirocin (BACTROBAN) 2 % Ointment  Active    risperidone (RISPERDAL) 0.25 MG Tab  Active                ALLERGIES  No Known Allergies    PHYSICAL EXAM  VITAL SIGNS: /56 mmHg  Pulse 67  Temp(Src) 36.3 °C (97.4 °F)  Resp 16  Ht 1.575 m (5' 2\")  Wt 63.504 kg (140 lb)  BMI 25.60 kg/m2  Constitutional: Well developed, Well nourished, No acute distress, Non-toxic appearance.   HEENT: Normocephalic, Atraumatic,  external ears normal, pharynx pink,  Mucous  Membranes moist, No rhinorrhea or mucosal edema  Eyes: PERRL, EOMI, Conjunctiva normal, No discharge.   Neck: Normal range of motion, No tenderness, Supple, No stridor.   Lymphatic: No lymphadenopathy    Cardiovascular: Regular Rate and Rhythm, No murmurs,  rubs, or gallops.   Thorax & Lungs: Lungs clear to auscultation bilaterally, No respiratory distress, No wheezes, rhales or rhonchi, No chest wall tenderness.   Abdomen: Bowel sounds normal, Soft, non tender, non distended,  No pulsatile masses., no rebound guarding or peritoneal signs.   Skin: Warm, Dry, No erythema, No rash,   Back:  No CVA tenderness,  No spinal tenderness, bony crepitance step offs or instability.   Extremities: Equal, intact distal pulses, No cyanosis, clubbing or edema,  No tenderness.   Musculoskeletal: Good range of motion in all major joints. No tenderness to palpation or major deformities noted.   Neurologic: Alert & oriented x 2 Cranial nerves II-XII intact, Equal strength and sensation upper and lower extremities bilaterally,  No focal deficits noted. "   Psychiatric: Affect normal, Judgment normal, Mood normal.      EKG  EKG Interpretation    Interpreted by me    Rhythm: normal sinus   Rate: normal  Axis: normal  Ectopy: none  Conduction: normal  ST Segments: no acute change  T Waves: no acute change  Q Waves: none    Clinical Impression: no acute changes and normal EKG    RADIOLOGY/PROCEDURES  CT-HEAD W/O   Final Result         NO ACUTE ABNORMALITIES ARE NOTED ON CT SCAN OF THE HEAD.      Findings are consistent with atrophy.  Decreased attenuation in the periventricular white matter likely indicates microvascular ischemic disease.      DX-CHEST-PORTABLE (1 VIEW)   Final Result         1.  No acute cardiac or pulmonary abnormality is identified.      2.  Hiatal hernia and calcified granulomas again noted.            COURSE & MEDICAL DECISION MAKING  Pertinent Labs & Imaging studies reviewed. (See chart for details)  Differential diagnosis: UTI, TIA, dehydration, electrolyte disturbance, anemia, pneumonia    Results for orders placed or performed during the hospital encounter of 04/08/17   CBC WITH DIFFERENTIAL   Result Value Ref Range    WBC 7.3 4.8 - 10.8 K/uL    RBC 4.27 4.20 - 5.40 M/uL    Hemoglobin 13.3 12.0 - 16.0 g/dL    Hematocrit 42.4 37.0 - 47.0 %    MCV 99.3 (H) 81.4 - 97.8 fL    MCH 31.1 27.0 - 33.0 pg    MCHC 31.4 (L) 33.6 - 35.0 g/dL    RDW 49.0 35.9 - 50.0 fL    Platelet Count 186 164 - 446 K/uL    MPV 12.2 9.0 - 12.9 fL    Neutrophils-Polys 68.80 44.00 - 72.00 %    Lymphocytes 18.40 (L) 22.00 - 41.00 %    Monocytes 8.40 0.00 - 13.40 %    Eosinophils 3.30 0.00 - 6.90 %    Basophils 0.80 0.00 - 1.80 %    Immature Granulocytes 0.30 0.00 - 0.90 %    Nucleated RBC 0.00 /100 WBC    Neutrophils (Absolute) 5.03 2.00 - 7.15 K/uL    Lymphs (Absolute) 1.34 1.00 - 4.80 K/uL    Monos (Absolute) 0.61 0.00 - 0.85 K/uL    Eos (Absolute) 0.24 0.00 - 0.51 K/uL    Baso (Absolute) 0.06 0.00 - 0.12 K/uL    Immature Granulocytes (abs) 0.02 0.00 - 0.11 K/uL    NRBC  (Absolute) 0.00 K/uL   COMP METABOLIC PANEL   Result Value Ref Range    Sodium 143 135 - 145 mmol/L    Potassium 4.1 3.6 - 5.5 mmol/L    Chloride 110 96 - 112 mmol/L    Co2 26 20 - 33 mmol/L    Anion Gap 7.0 0.0 - 11.9    Glucose 92 65 - 99 mg/dL    Bun 29 (H) 8 - 22 mg/dL    Creatinine 1.23 0.50 - 1.40 mg/dL    Calcium 8.8 8.4 - 10.2 mg/dL    AST(SGOT) 16 12 - 45 U/L    ALT(SGPT) 10 2 - 50 U/L    Alkaline Phosphatase 38 30 - 99 U/L    Total Bilirubin 0.6 0.1 - 1.5 mg/dL    Albumin 3.1 (L) 3.2 - 4.9 g/dL    Total Protein 5.4 (L) 6.0 - 8.2 g/dL    Globulin 2.3 1.9 - 3.5 g/dL    A-G Ratio 1.3 g/dL   TROPONIN   Result Value Ref Range    Troponin I <0.02 0.00 - 0.04 ng/mL   PRTOTHROMBIN TIME (INR)   Result Value Ref Range    PT 12.6 12.0 - 14.6 sec    INR 0.96 0.87 - 1.13   APTT   Result Value Ref Range    APTT 24.0 (L) 24.7 - 36.0 sec   ESTIMATED GFR   Result Value Ref Range    GFR If  49 (A) >60 mL/min/1.73 m 2    GFR If Non  41 (A) >60 mL/min/1.73 m 2   URINALYSIS,CULTURE IF INDICATED   Result Value Ref Range    Color Yellow     Character Clear     Specific Gravity 1.010 <1.035    Ph 6.0 5.0-8.0    Glucose Negative Negative mg/dL    Ketones Negative Negative mg/dL    Protein Negative Negative mg/dL    Bilirubin Negative Negative    Nitrite Negative Negative    Leukocyte Esterase Negative Negative    Occult Blood Negative Negative    Micro Urine Req see below     Culture Indicated No UA Culture   EKG (ER)   Result Value Ref Range    Report       [facility]    Test Date:  2017  Pt Name:    VERNON EL             Department: EDS  MRN:        [id number]                      [facility]  Gender:     F                            Technician: [id number]  :        [id number]                   Requested By:JORGITO MONTIEL  Order #:    [id number]                    Reading MD: JORGITO MONTIEL MD    Measurements  Intervals                                 Axis  Rate:       63                           P:          40  SC:         180                          QRS:        9  QRSD:       94                           T:          24  QT:         408  QTc:        418    Interpretive Statements  SINUS RHYTHM  No previous ECG available for comparison    Electronically Signed On 4-8-2017 8:50:05 PDT by JORGITO MONTIEL MD          10:29 AM . Despite the patient's negative workup. She still unable to ambulate or stand on her own I spoke with the hospitalist Dr. Phelan who has accepted the patient for admission. I spoke with the family and we'll admit her for further observation and care.        FINAL IMPRESSION  1. Failure to thrive in adult    2. Falls, initial encounter    3. Near syncope           PLAN/DISPOSITION  Admit          Electronically signed by: Jorgito Montiel, 4/8/2017 8:32 AM

## 2017-04-08 NOTE — LETTER
[facility] - [id number]  EHR eReferral Notification Requirements    To be sent by secure email to [email or web address] or   by fax to [phone]       FIELDS ARE REQUIRED TO BE COMPLETED     Patient Name:  Cely Knowles  MRN:  [id number]   Account Number: [id number]                YOB: 1924    Date Roomed in ED:    4/8/2017   8:22 AM  Date First Observation Order Placed: 4/8/2017   10:31 AM  Date First Inpatient Order Placed:        Date of Previous Admission (Needed For Readmission Reviews Only):     Discharge Date and Time (if applicable): No discharge date for patient encounter.     PLEASE CHECK OFF TYPE OF REVIEW & CURRENT ADMISSION STATUS FROM LISTS BELOW  Type of Review:  Admission review    Dates to be Reviewed: 4/8-4/10/17        Current Admission Status:  Observation-Outpatient    / Contact Number for EHR outcome/recommendation call:    Daria Manzo RN   [phone]     Attending Physician/ Contact Number (if not the same as in electronic record):  Dr Yunior Justin  [phone]     Comments:        Additional Information being Emailed or Faxed:  no       Fax Handwritten Supporting Documents to EHR at [phone]      [address] [address]  [phone]  [email or web address]    Updated December 17, 2014

## 2017-04-08 NOTE — IP AVS SNAPSHOT
" <p align=\"LEFT\"><IMG SRC=\"[email or web address]\" alt=\"Image\" WIDTH=\"50%\" HEIGHT=\"200\" BORDER=\"\"></p>                   Name:Cely Knowles  Medical Record Number:[id number]  CSN: [id number]    YOB: 1924   Age: 89 y.o.  Sex: female  HT:1.575 m (5' 2\") WT: 48.5 kg (106 lb 14.8 oz)          Admit Date: 4/8/2017     Discharge Date:   Today's Date: 4/13/2017  Attending Doctor:  Yunior Justin D.O.                  Allergies:  Review of patient's allergies indicates no known allergies.          Follow-up Information     1. Follow up with Aimee Zendejas PA-C. Go on 4/27/2017.    Specialty:  Family Medicine    Why:  Follow Up, per patients daughter existing appointment on 4/27 with PCP    Contact information    [address]  [facility] [address]  [phone]           Medication List      Take these Medications        Instructions    artificial tears 1.4 % Soln    Place 1 Drop in both eyes as needed.   Dose:  1 Drop       ascorbic acid 1000 MG tablet   Commonly known as:  VITAMIN C    Take 1,000 mg by mouth every evening.   Dose:  1000 mg       aspirin 81 MG tablet    Take 81 mg by mouth every evening.   Dose:  81 mg       Calcium Citrate-Vitamin D 315-250 MG-UNIT Tabs    Take 2 Tabs by mouth 2 Times a Day.   Dose:  2 Tab       D3 SUPER STRENGTH 2000 UNIT Caps   Generic drug:  Cholecalciferol    Take 2,000 Units by mouth every day at 6 PM.   Dose:  2000 Units       meloxicam 7.5 MG Tabs   Commonly known as:  MOBIC    Take 7.5 mg by mouth every day.   Dose:  7.5 mg       Mirabegron ER 25 MG Tb24   What changed:    - medication strength  - how much to take  - when to take this    Take 25 mg by mouth every day.   Dose:  25 mg       mupirocin 2 % Oint   Commonly known as:  BACTROBAN    Apply 2 % to affected area(s) every day. Applied by home health to ulcer RIGHT foot   Dose:  2 %       NAMENDA XR 28 MG Cp24   Generic drug:  Memantine HCl ER    Take 28 mg by mouth every day at 6 PM.   "   Dose:  28 mg       MURPHY MILK OF MAGNESIA 311 MG Chew   Generic drug:  Magnesium Hydroxide    Take 4 Tabs by mouth 2 times a day as needed (constipation).   Dose:  4 Tab       risperidone 0.25 MG Tabs   Commonly known as:  RISPERDAL    Take 0.25 mg by mouth every bedtime.   Dose:  0.25 mg       TYLENOL 500 MG Tabs   Generic drug:  acetaminophen    Take 500 mg by mouth 2 Times a Day.   Dose:  500 mg

## 2017-04-08 NOTE — ED NOTES
Iv saline lock in right wrist , patient, flushes well from field. Lab tot draw patient, family at the bedside and the plan of care discussed.

## 2017-04-09 PROBLEM — J96.01 ACUTE RESPIRATORY FAILURE WITH HYPOXIA (HCC): Status: ACTIVE | Noted: 2017-01-01

## 2017-04-09 PROBLEM — F03.C0 ADVANCED DEMENTIA (HCC): Status: ACTIVE | Noted: 2017-01-01

## 2017-04-09 NOTE — PROGRESS NOTES
O2 desat to 86% on RA. Had pt do deep breathing exercise, and O2 increased to 88%. 1L O2 via NC applied and O2 sat increased to 90% but did not sustain. Titrated O2 via NC up to 2L and pt sustaining 93-94% now. No c/o SOB or dyspnea. Up to BSC with 2-person assist. Pt does not help at all during transfer. Pt lifted and pivoted onto commode. Voided x1. Returned to bed and made comfortable. BLE floated on pillow. CLIP. No further needs. Will monitor.

## 2017-04-09 NOTE — PROGRESS NOTES
Bedside report given to OSIRIS Rebolledo. POC discussed. Pt sitting up in cardiac chair. Transferred to [facility]- with all belongings d/t fall risk. Lines patent. Fall precautions in place.

## 2017-04-09 NOTE — PROGRESS NOTES
Received bedside report from Ivory NEWELL. Assumed care of pt who is sitting up in chair, RR even/unlabored. Fall protocol in place. CLIP. Will continue to monitor.

## 2017-04-09 NOTE — DISCHARGE PLANNING
Care Transition Team Assessment    Information Source  Orientation : Disoriented to Time  Information Given By: Patient  Who is responsible for making decisions for patient? : Adult child  Name(s) of Primary Decision Maker: Tiarra Encinas    Readmission Evaluation  Is this a readmission?: No    Elopement Risk  Legal Hold: No  Ambulatory or Self Mobile in Wheelchair: No-Not an Elopement Risk  Elopement Risk: Not at Risk for Elopement    Interdisciplinary Discharge Planning  Does Admitting Nurse Feel This Could be a Complex Discharge?: No  Primary Care Physician: Keven  Lives with - Patient's Self Care Capacity:  (assistive living)  Patient or legal guardian wants to designate a caregiver (see row info): No  Support Systems: Family Member(s), Friends / Neighbors  Housing / Facility: Assisted Living Residence  Do You Take your Prescribed Medications Regularly: Yes  Able to Return to Previous ADL's: Future Time w/Therapy  Mobility Issues: Yes  Prior Services: Home With Outpatient Therapy, Skilled Home Health Services  Patient Expects to be Discharged to:: Assisted living  Assistance Needed: Yes  Durable Medical Equipment: Walker    Discharge Preparedness  What is your plan after discharge?: Other (comment) (Return to [facility])  What are your discharge supports?: Child, Other (comment) (and staff)  Prior Functional Level: Independent with Activities of Daily Living, Needs Assist with Medication Management, Uses Walker  Difficulity with ADLs: Walking  Difficulty with ADLs Comment: used FWW  Difficulity with IADLs: Cooking, Driving, Keeping track of finances, Laundry, Managing medication, Shopping, Using the telephone or computer  Difficulity with IADL Comments: Staff at [facility] or [facility] assist with tasks.    Functional Assesment  Prior Functional Level: Independent with Activities of Daily Living, Needs Assist with Medication Management, Uses Walker    Finances  Financial Barriers to Discharge: No  Prescription  Coverage: Yes    Vision / Hearing Impairment  Vision Impairment : Yes  Right Eye Vision: Impaired, Wears Glasses  Left Eye Vision: Impaired, Wears Glasses  Hearing Impairment : No    Values / Beliefs / Concerns  Values / Beliefs Concerns : No    Advance Directive  Advance Directive?: None    Domestic Abuse  Have you ever been the victim of abuse or violence?: No  Physical Abuse or Sexual Abuse: No  Verbal Abuse or Emotional Abuse: No  Possible Abuse Reported to:: Not Applicable    Psychological Assessment  History of Substance Abuse: None  History of Psychiatric Problems: No    Discharge Risks or Barriers  Discharge risks or barriers?: No    Anticipated Discharge Information  Anticipated discharge disposition: Assisted living

## 2017-04-09 NOTE — CARE PLAN
Problem: Safety  Goal: Will remain free from falls  Outcome: PROGRESSING AS EXPECTED  x1-2 assist, non-ambulatory at this time. CLIP. Pt calls for assist appropriately. Hourly rounding. Personal belongings within reach. Non-skid socks. Bed alarm on. Bed locked & in low position.            Problem: Venous Thromboembolism (VTW)/Deep Vein Thrombosis (DVT) Prevention:  Goal: Patient will participate in Venous Thrombosis (VTE)/Deep Vein Thrombosis (DVT)Prevention Measures  Outcome: PROGRESSING AS EXPECTED    04/08/17 1917   OTHER   Risk Assessment Score 4   VTE RISK High   Pharmacologic Prophylaxis Used Unfractionated Heparin         Problem: Knowledge Deficit  Goal: Knowledge of disease process/condition, treatment plan, diagnostic tests, and medications will improve  Outcome: PROGRESSING AS EXPECTED  AOx3 (disoriented to time only), forgetful. POC discussed w/ pt. Understands disease process and treatment plan. Pending PT/OT eval. Educated on new meds & procedures/tests. Questions answered.        Problem: Mobility  Goal: Risk for activity intolerance will decrease  Outcome: PROGRESSING AS EXPECTED  Pending PT/OT eval. Able to turn self in bed. Non-ambulatory at this time.

## 2017-04-09 NOTE — PROGRESS NOTES
Tele Note    Rhythm Sinus Rhythm  Rate 60-80  CT 0.16  QRS 0.10  QT 0.42  Ectopy frequent PAC, Rare PVC, trip

## 2017-04-09 NOTE — CARE PLAN
Problem: Safety  Goal: Will remain free from falls  Intervention: Implement fall precautions    04/08/17 1130 04/09/17 0841   OTHER   Environmental Precautions --  Treaded Slipper Socks on Patient;Personal Belongings, Wastebasket, Call Bell etc. in Easy Reach;Transferred to Stronger Side;Report Given to Other Health Care Providers Regarding Fall Risk;Bed in Low Position;Communication Sign for Patients & Families;Mobility Assessed & Appropriate Sign Placed   IV Pole on Same Side of Bed as Bathroom --  Yes   Bedrails --  Bedrails Closest to Bathroom Down   Chair/Bed Strip Alarm --  Yes - Alarm On   Bed Alarm (Built in - for ICU ONLY) Patient Educated Regarding Fall Risk and Need for Bed Alarm, Understands and Continues to Refuse --      Pt sitting up in cardiac chair with chair alarm at this time. Educated pt on need for bed alarm. Verbalized understanding.       Problem: Skin Integrity  Goal: Risk for impaired skin integrity will decrease  Intervention: Implement precautions to protect skin integrity in collaboration with the interdisciplinary team    04/09/17 0841   OTHER   Skin Preventative Measures Pillows in Use for Support / Positioning   Bed Types Pressure Redistribution Mattress (Atmosair)   Friction Interventions Draw Sheet / Pad Used for Repositioning   Moisturizers Barrier Cream   PT / OT Involved in Care Order has been Placed   Activity  Cardiac chair;Bed   Patient Turns / Repositioning Patient Turns Self from Side to Side   Patient is Receiving Nutrition Oral Intake Adequate   Vitamin Therapy in Use Yes     Educated pt on need for turning and eating meals. Verbalized understanding. Reinforcement needed.

## 2017-04-09 NOTE — PROGRESS NOTES
Hospital Medicine Progress Note, Adult, Complex               Author: Bridger Guevara Date & Time created: 2017  8:17 AM     ID: 89yo F with PMHx of dementia placed in observation for near-syncope vs. Syncopal episode/    Interval History:  Daughter at bedside.  Notes improvement with mentation, but not at baseline.  Patient still very weak in lower extremities.     Review of Systems:  Review of Systems   Unable to perform ROS: dementia       Physical Exam:  Physical Exam   Constitutional: She appears well-developed and well-nourished.   HENT:   Head: Normocephalic and atraumatic.   Eyes: Conjunctivae and EOM are normal. Pupils are equal, round, and reactive to light.   Neck: Normal range of motion.   Cardiovascular: Normal heart sounds.    Pulmonary/Chest: Effort normal and breath sounds normal.   Abdominal: Soft. Bowel sounds are normal.   Musculoskeletal:   Bilateral lower extremity weakness   Skin: Skin is warm and dry.   Nursing note and vitals reviewed.      Labs:        Invalid input(s): [id number]  Recent Labs      17   0855   TROPONINI  <0.02     Recent Labs      17   0855   SODIUM  143   POTASSIUM  4.1   CHLORIDE  110   CO2  26   BUN  29*   CREATININE  1.23   CALCIUM  8.8     Recent Labs      17   0855   ALTSGPT  10   ASTSGOT  16   ALKPHOSPHAT  38   TBILIRUBIN  0.6   GLUCOSE  92     Recent Labs      17   0855   RBC  4.27   HEMOGLOBIN  13.3   HEMATOCRIT  42.4   PLATELETCT  186   PROTHROMBTM  12.6   APTT  24.0*   INR  0.96     Recent Labs      17   0855   WBC  7.3   NEUTSPOLYS  68.80   LYMPHOCYTES  18.40*   MONOCYTES  8.40   EOSINOPHILS  3.30   BASOPHILS  0.80   ASTSGOT  16   ALTSGPT  10   ALKPHOSPHAT  38   TBILIRUBIN  0.6           Hemodynamics:  Temp (24hrs), Av.5 °C (97.7 °F), Min:36.3 °C (97.3 °F), Max:36.8 °C (98.2 °F)  Temperature: 36.3 °C (97.3 °F)  Pulse  Av.2  Min: 65  Max: 74   Blood Pressure : 131/46 mmHg     Respiratory:    Respiration: 16, Pulse  Oximetry: 93 %, O2 Daily Delivery Respiratory : Room Air with O2 Available           Fluids:    Intake/Output Summary (Last 24 hours) at 04/09/17 0817  Last data filed at 04/09/17 0659   Gross per 24 hour   Intake   1890 ml   Output   1000 ml   Net    890 ml     Weight: 48.5 kg (106 lb 14.8 oz)  GI/Nutrition:  Orders Placed This Encounter   Procedures   • Diet Order     Standing Status: Standing      Number of Occurrences: 1      Standing Expiration Date:      Order Specific Question:  Diet:     Answer:  Regular [1]     Medical Decision Making, by Problem:  Active Hospital Problems    Diagnosis   • Weakness [R53.1]   • Near syncope [R55]  - orthostats pending  - no tele events overnight   - CT findings unremarkable; ECHO pending   - PT/OT eval and treatment appreciated     • Acute respiratory failure with hypoxia (CMS-HCC) [J96.01]  - had episode overnight which was relieved with O2 supplementation   - currently on RA; monitoring     • Advanced dementia [F03.90]  - cont outpt meds         Core Measures

## 2017-04-09 NOTE — H&P
CHIEF COMPLAINT:  Passing out.    HISTORY OF PRESENT ILLNESS:  Patient is a pleasant 89-year-old female that has   a history of advanced dementia.  She is a resident at [facility].  Today   she is apparently walking to her room with a walker when she felt that her   legs are going to give out.  The staff at [facility] gave her a chair.  When   the CMA returned, she found that the patient had passed out.  Patient is   unable to give me any type of history, but she has been quite weak here.  She   is unable to sit up on her own.  Her daughter at bedside reports that she last   saw her this past Thursday and then Thursday she was able to ambulate and do   things on her own and today she seems much weaker than usual.  Patient again   has no complaints, but then she also does have advanced dementia.    REVIEW OF SYSTEMS:  Unable to obtain secondary to patient's dementia.    PAST MEDICAL HISTORY:  History has been obtained from family at bedside, but   daughter reports that the patient has a history of dementia only.    PAST SURGICAL HISTORY:   x1.    SOCIAL HISTORY:  Negative for tobacco, drugs, or alcohol use.    FAMILY HISTORY:  Unable to obtain at this time.    HOME MEDICATIONS:  1.  Calcium citrate plus vitamin D.  2.  Vitamin C.  3.  Risperdal 0.25 mg at bedtime.  4.  Magnesium hydroxide.  5.  Artificial tears.    ALLERGIES:  No known drug allergies.    PHYSICAL EXAMINATION:  VITAL SIGNS:  Blood pressure is 156/67, pulse of 70, respirations 16,   temperature 36.6, oxygen saturations 93% on room air.  GENERAL:  The patient is pleasant, resting comfortably, not in any acute   distress.  HEENT:  Dry mucous membranes.  No oropharyngeal exudates.  Eyes, EOMI, PERRLA.  NECK:  No lymphadenopathy, no JVD.  CARDIOVASCULAR:  Regular rate and rhythm.  No murmurs.  LUNGS:  Clear to auscultation bilaterally.  No rales or rhonchi.  ABDOMEN:  Positive bowel sounds, soft, nontender, nondistended.  EXTREMITIES:  No  clubbing or cyanosis.  NEUROLOGIC:  She is awake, alert, and oriented to person only.    IMAGING:  CT head shows atrophy, but no acute abnormalities.  Chest x-ray   shows no acute cardiopulmonary abnormalities.    LABORATORY DATA:  WBC 7.3, hemoglobin 13.3, hematocrit 42.4, platelets 186.    Sodium 143, potassium 4.1, BUN 29, creatinine 1.23, and albumin is 3.1.    ASSESSMENT AND PLAN:  Patient is a 89-year-old female with a history of   advanced dementia, presents to the hospital after a syncopal event.  1.  Syncope, etiology is not clear.  The patient is unable to give any type of   history At this point, we will do cardiac monitoring and IV fluids only.    After having an extensive discussion with family, we will not be aggressive   with this such as ordering an echocardiogram and carotid ultrasound.  2.  Weakness.  We will consult physical therapy and occupational therapy for   recommendations.  She may need skilled nursing placement.  She is quite weak.  3.  Advanced dementia.  4.  She is DNR code status.       ____________________________________     MD LUIS FELIPE Mccall / SHRAVAN    DD:  04/08/2017 18:13:48  DT:  04/08/2017 18:37:15    D#:  [id number]  Job#:  [id number]

## 2017-04-09 NOTE — PROGRESS NOTES
Assessment completed, medicated per MAR. Plan of care discussed. Pt asking about daughter. aaox3 disoriented to time. Pt sitting in chair with chair alarm on.

## 2017-04-09 NOTE — PROGRESS NOTES
AOx3 (disoriented to time). Afebrile. Denied pain. Assessment per doc flowsheet. PIV intact & patent. IVF infusing. All due NOC meds given (see MAR). States understanding of POC. No additional concerns at this time. CLIP. Personal belongings within reach. Non-skid socks. Bed locked & in low position. Bed alarm on.

## 2017-04-09 NOTE — PROGRESS NOTES
Tele strip at 1922 shows sinus rhythm w/ HR of 74.     Measurements: 0.16 / 0.10 / 0.38    Tele Shift Summary:    Rhythm : SR  Rate : 60-80s    Ectopy : Per CCT Nina, pt had frequent PACs, rare PVC/triplets.     Telemetry monitoring strips placed in pt chart.

## 2017-04-10 NOTE — PROGRESS NOTES
"AOx3 (disoriented to time). Tearful at times. Calling out for help to everyone passing her room because \"I need to get out of this mess.\" Pulling on tele leads and IV line. Redirected pt and reassured her she is okay and not in a mess. Afebrile. Denied pain. Assessment per doc flowsheet. PIV intact & patent. IVF infusing. POC discussed. Sitting up in bed. No additional concerns at this time. CLIP. Personal belongings within reach. Non-skid socks. Bed locked & in low position. Bed alarm on.    "

## 2017-04-10 NOTE — PROGRESS NOTES
Tele strip at 1857 shows sinus rhythm w/ HR of 74.     Measurements: 0.16 / 0.08 / 0.36    Tele Shift Summary:    Rhythm : SR  Rate : 70-80s    Ectopy : Per CCT Nina, pt had frequent PACs, rare PVCs.     Telemetry monitoring strips placed in pt chart.

## 2017-04-10 NOTE — PROGRESS NOTES
Assumed care of patient. Bedside report from OSIRIS Rebolledo. POC discussed. Lines patent. CLIP. Fall precautions in place.

## 2017-04-10 NOTE — CARE PLAN
Problem: Nutritional:  Goal: Achieve adequate nutritional intake  Patient will consume >50% of meals  Outcome: PROGRESSING AS EXPECTED

## 2017-04-10 NOTE — DIETARY
"Nutrition services.  Pt with poor po admit trigger.  89 yr old with weakness, advanced dementia, and near syncope.    Ht:  5'2\"  Wt:  Stated 63.5 kg (140 lbs)     Bed scale 48.5 kg (107 lbs)  UBW:  130-140 lbs  Diet order:  Regular  Medications and labs reviewed.    Spoke with family member during my visit.  He states that pt UBW is 130 -140 lbs.  Noted discrepancy in above wts.  Intake at present not consistent.  Intake at dinner 4/9 0%, intake at breakfast 4/10 %. Family requesting chocolate milkshakes with lunch and dinner.  No chewing difficulties stated at this time.  Per family pt prefers smaller portion sizes.  Plan:  Encourage po intake, monitor po intake progress, attain wt to verify data, milkshakes with lunch and dinner, smaller portions.  "

## 2017-04-10 NOTE — RESPIRATORY CARE
COPD EDUCATION by COPD CLINICAL EDUCATOR  4/10/2017 at 8:10 AM by Elen Galvan     Patient reviewed by COPD education team. Patient does not qualify for COPD program.

## 2017-04-10 NOTE — CARE PLAN
Problem: Safety  Goal: Will remain free from falls  Outcome: PROGRESSING AS EXPECTED  x2 assist FWW, non-ambulatory at this time. CLIP. Pt does not call for assist appropriately. Reminders given. Hourly rounding. Personal belongings within reach. Non-skid socks. Bed alarm on. Bed locked & in low position.      Problem: Venous Thromboembolism (VTW)/Deep Vein Thrombosis (DVT) Prevention:  Goal: Patient will participate in Venous Thrombosis (VTE)/Deep Vein Thrombosis (DVT)Prevention Measures  Outcome: PROGRESSING AS EXPECTED  Heparin injections TID per MD order. Non-ambulatory. Passive ROM while in bed when awake. No s/s DVT/VTE at this time.    Problem: Knowledge Deficit  Goal: Knowledge of disease process/condition, treatment plan, diagnostic tests, and medications will improve  Outcome: PROGRESSING AS EXPECTED  AOx3 (disoriented to time only), forgetful. POC discussed w/ pt. Understands disease process and treatment plan. Pending PT/OT eval. Educated on new meds & procedures/tests. Questions answered.      Problem: Mobility  Goal: Risk for activity intolerance will decrease  Outcome: PROGRESSING AS EXPECTED  x2 assist w/ FWW. PT/OT involved. Able to turn self in bed. Non-ambulatory at this time.

## 2017-04-10 NOTE — PROGRESS NOTES
Bedside report given to OSIRIS Rebolledo. POC discussed. Pt resting in bed. Lines patent. Fall precautions in place.

## 2017-04-10 NOTE — PROGRESS NOTES
Up to BSC to void with 2-person assist. Orthostatic BP done with positive results, but pt denied any dizziness. Back to bed and repositioned for comfort. Due NOC meds given (see MAR). CLIP. Will monitor.

## 2017-04-10 NOTE — PROGRESS NOTES
0830Report received, plan of care reviewed and discussed, assessment complete, oriented to room, bed alarm on, nonskid socks applied, advised to call for assistance.   1030 Discussed plan of care, encouraged and answered all questions, will continue to monitor.  1230 Resting, all needs met at this time.  1430 Up to bedside commode, 2 person assist with walker.  1630 Up to chair.  1830 Eating dinner up in chair.  Cardiac summary.  Sinus rhythm with frequent PACs (0.16/0.10/0.38)  Report given to next shift.

## 2017-04-10 NOTE — THERAPY
"Occupational Therapy Evaluation completed.   Functional Status: A&Ox3. Mild confusion, STM deficits noted. Sundowners may be present; pt with increased confusion towards end of session. Tolerates UIC >3hrs. Seated grooming with CGA, v/c's. Scooting with Nieves. STS with ModA, FWW, v/c's. Stands x4\". Unable to step.    Plan of Care: Will benefit from Occupational Therapy 3 times per week  Discharge Recommendations:  Equipment: Will Continue to Assess for Equipment Needs.  Discharge to a transitional care facility for continued skilled therapy services; Rec SNF.  Lives at [facility] where she received assistance with bathing,dressing,grooming,LE dressing. She had been able to walk with FWW in room, to meals at times.      See \"Rehab Therapy-Acute\" Patient Summary Report for complete documentation.    "

## 2017-04-11 NOTE — PROGRESS NOTES
"0700-Assumed care,  bedside report received. Pt resting in bed comfortably, asleep. Pt c/o no pain. All lines, tubes, and pumps checked and verified. All orders, labs, and medications reviewed. POC discussed, questions and concerns addressed. Bed locked, fall precautions in place, bed alarm on, call light within reach. Will continue to monitor.     1200-Patient remains sitting up in chair. No needs at this time. Family sitting at bedside.     1600-Patient beginning to have signs of \"sundowning\". She is becoming more forgetful and paranoid, mildly agitated. Requiring repeated re-orientation.   "

## 2017-04-11 NOTE — PROGRESS NOTES
Assisted pt to bed w/. Help of assigned CNA. Pt alert and able to follow command. POC re: IVF discussed to pt and family member. Positioned comfortably in bed w/. HOB  Slightly elevated. Call light use encouraged. Will continue to monitor.

## 2017-04-11 NOTE — PROGRESS NOTES
Tele strip at 1919 shows Sinus rhythm with a HR of 73.      Measurements: .16/.08/.36    Tele Shift Summary:    Rhythm : Sinus rhythm  Rate : 60s to 70s    Ectopy : Per CCT Nina pt had occasional to rare PVCs    Telemetry monitoring strips placed in pt chart. Primary RN to monitor

## 2017-04-11 NOTE — PROGRESS NOTES
Hospital Medicine Progress Note, Adult, Complex               Author: Yunior Justin Date & Time created: 4/11/2017  7:41 AM     Interval History:  ID: 89yo F with advanced dementia living in assisted living and found down with presumed syncope.    Today:  Awake sitting up in a chair.  Pleasant.  Aware of being in the hospital and why.  Care discussed with RN during bedside rounds and she states patient weak requiring assist with standing.   states home health at assisted living facility available.    Review of Systems:  Review of Systems   Constitutional: Negative for fever.   HENT: Negative for congestion.    Eyes: Negative for discharge.   Respiratory: Negative for shortness of breath.    Cardiovascular: Negative for chest pain.   Gastrointestinal: Negative for nausea and abdominal pain.   Genitourinary: Negative for urgency and frequency.        Urinary retention   Musculoskeletal: Negative for back pain and joint pain.   Neurological: Positive for weakness. Negative for dizziness, tremors, speech change and headaches.   Psychiatric/Behavioral: Negative for hallucinations. The patient is not nervous/anxious.        Physical Exam:  Physical Exam   Constitutional: She is oriented to person, place, and time. No distress.   HENT:   Head: Normocephalic and atraumatic.   Nose: Nose normal.   Mouth/Throat: Oropharynx is clear and moist. No oropharyngeal exudate.   Eyes: Conjunctivae and EOM are normal. Right eye exhibits no discharge. Left eye exhibits no discharge. No scleral icterus.   Neck: Normal range of motion. No tracheal deviation present.   Cardiovascular: Normal rate, regular rhythm, normal heart sounds and intact distal pulses.    No murmur heard.  Pulmonary/Chest: Effort normal and breath sounds normal. No stridor. No respiratory distress. She has no wheezes.   Abdominal: Soft. Bowel sounds are normal. She exhibits no distension. There is no tenderness. There is no rebound.   Musculoskeletal:  She exhibits no edema.   Lymphadenopathy:     She has no cervical adenopathy.   Neurological: She is alert and oriented to person, place, and time. No cranial nerve deficit.   Skin: Skin is warm and dry. She is not diaphoretic.   Psychiatric: She has a normal mood and affect.   Vitals reviewed.      Labs:        Invalid input(s): [id number]  Recent Labs      17   TROPONINI  <0.02     Recent Labs      04/08/17   0855  04/10/17   0543   SODIUM  143  144   POTASSIUM  4.1  4.0   CHLORIDE  110  116*   CO2  26  22   BUN  29*  16   CREATININE  1.23  0.90   CALCIUM  8.8  8.3*     Recent Labs      04/08/17   0855  04/10/17   0543   ALTSGPT  10   --    ASTSGOT  16   --    ALKPHOSPHAT  38   --    TBILIRUBIN  0.6   --    GLUCOSE  92  82     Recent Labs      04/08/17   0855  04/10/17   0543   RBC  4.27  4.15*   HEMOGLOBIN  13.3  12.9   HEMATOCRIT  42.4  41.7   PLATELETCT  186  SEE NOTE   PROTHROMBTM  12.6   --    APTT  24.0*   --    INR  0.96   --      Recent Labs      04/08/17   0855  04/10/17   0543   WBC  7.3  8.1   NEUTSPOLYS  68.80  69.30   LYMPHOCYTES  18.40*  18.30*   MONOCYTES  8.40  8.30   EOSINOPHILS  3.30  2.80   BASOPHILS  0.80  0.90   ASTSGOT  16   --    ALTSGPT  10   --    ALKPHOSPHAT  38   --    TBILIRUBIN  0.6   --            Hemodynamics:  Temp (24hrs), Av.6 °C (97.8 °F), Min:36.3 °C (97.3 °F), Max:36.9 °C (98.5 °F)  Temperature: 36.9 °C (98.5 °F)  Pulse  Av.5  Min: 65  Max: 95   Blood Pressure : 142/49 mmHg     Respiratory:    Respiration: 18, Pulse Oximetry: 98 %        RUL Breath Sounds: Diminished, RML Breath Sounds: Diminished, RLL Breath Sounds: Diminished, JAMES Breath Sounds: Diminished, LLL Breath Sounds: Diminished  Fluids:    Intake/Output Summary (Last 24 hours) at 17 0741  Last data filed at 17 0700   Gross per 24 hour   Intake   2520 ml   Output    850 ml   Net   1670 ml        GI/Nutrition:  Orders Placed This Encounter   Procedures   • Diet Order     Standing  Status: Standing      Number of Occurrences: 1      Standing Expiration Date:      Order Specific Question:  Diet:     Answer:  Regular [1]     Medical Decision Making, by Problem:  Active Hospital Problems    Diagnosis   • Weakness [R53.1]     • Near syncope [R55]  - orthostatic BP positive per prior notes.  Recheck and ordered cortisol and TSH w/ reflex to FT4 if abnormal.  - PT/OT.  Echocardiogram unremarkable.     • Acute respiratory failure with hypoxia (CMS-HCC) [J96.01]     • Advanced dementia [F03.90]  - continue memantine       *  Urinary retention:        - on mirabegron for urinary incontinence but now with retention.  Will discontinue.      *  Hypertension:        - due to dementia and age dropping her BP too low likely more harmful, will allow for current BP but keep SBP <160 if able.  Repeat orthostatics and checking TSH and cortisol.    Medications reviewed, Labs reviewed and Radiology images reviewed  Taylor catheter: No Taylor      DVT Prophylaxis: Heparin

## 2017-04-11 NOTE — PROGRESS NOTES
Paged hospitalist MD to get order for straight cath.Pt has no urine output since 9 pm. Bladder scan shows 400 ml urine.

## 2017-04-11 NOTE — PROGRESS NOTES
Hospital Medicine Progress Note, Adult, Complex               Author: Bridger VELASCO Ismael Date & Time created: 4/10/2017  5:24 PM     ID: 89yo F with PMHx of dementia placed in observation for near-syncope vs. Syncopal episode due to orthostatic hypotension.     Interval History:  Patient still very weak in lower extremities.  Orthostats positive    Review of Systems:  Review of Systems   Unable to perform ROS: dementia       Physical Exam:  Physical Exam   Constitutional: She appears well-developed and well-nourished.   HENT:   Head: Normocephalic and atraumatic.   Eyes: Conjunctivae and EOM are normal. Pupils are equal, round, and reactive to light.   Neck: Normal range of motion.   Cardiovascular: Normal heart sounds.    Pulmonary/Chest: Effort normal and breath sounds normal.   Abdominal: Soft. Bowel sounds are normal.   Musculoskeletal:   Bilateral lower extremity weakness   Skin: Skin is warm and dry.   Nursing note and vitals reviewed.      Labs:        Invalid input(s): [id number]  Recent Labs      04/08/17 0855   TROPONINI  <0.02     Recent Labs      04/08/17 0855  04/10/17   0543   SODIUM  143  144   POTASSIUM  4.1  4.0   CHLORIDE  110  116*   CO2  26  22   BUN  29*  16   CREATININE  1.23  0.90   CALCIUM  8.8  8.3*     Recent Labs      04/08/17   0855  04/10/17   0543   ALTSGPT  10   --    ASTSGOT  16   --    ALKPHOSPHAT  38   --    TBILIRUBIN  0.6   --    GLUCOSE  92  82     Recent Labs      04/08/17   0855  04/10/17   0543   RBC  4.27  4.15*   HEMOGLOBIN  13.3  12.9   HEMATOCRIT  42.4  41.7   PLATELETCT  186  SEE NOTE   PROTHROMBTM  12.6   --    APTT  24.0*   --    INR  0.96   --      Recent Labs      04/08/17   0855  04/10/17   0543   WBC  7.3  8.1   NEUTSPOLYS  68.80  69.30   LYMPHOCYTES  18.40*  18.30*   MONOCYTES  8.40  8.30   EOSINOPHILS  3.30  2.80   BASOPHILS  0.80  0.90   ASTSGOT  16   --    ALTSGPT  10   --    ALKPHOSPHAT  38   --    TBILIRUBIN  0.6   --            Hemodynamics:  Temp  (24hrs), Av.6 °C (97.8 °F), Min:36.3 °C (97.4 °F), Max:36.8 °C (98.3 °F)  Temperature: 36.3 °C (97.4 °F)  Pulse  Av.2  Min: 65  Max: 81   Blood Pressure : 154/46 mmHg     Respiratory:    Respiration: 19, Pulse Oximetry: 94 %        RUL Breath Sounds: Diminished, RML Breath Sounds: Diminished, RLL Breath Sounds: Diminished, JAMES Breath Sounds: Diminished, LLL Breath Sounds: Diminished  Fluids:    Intake/Output Summary (Last 24 hours) at 04/10/17 1724  Last data filed at 04/10/17 0659   Gross per 24 hour   Intake   2400 ml   Output    500 ml   Net   1900 ml        GI/Nutrition:  Orders Placed This Encounter   Procedures   • Diet Order     Standing Status: Standing      Number of Occurrences: 1      Standing Expiration Date:      Order Specific Question:  Diet:     Answer:  Regular [1]     Medical Decision Making, by Problem:  Active Hospital Problems    Diagnosis   • Weakness [R53.1]   • Near syncope [R55]  Orthostatic Hypotension  - orthostats positive; cont with IVFs  - no tele events overnight   - CT findings unremarkable; ECHO unremarkable  - PT/OT eval and treatment appreciated; will need SNF     • Acute respiratory failure with hypoxia (CMS-HCC) [J96.01]  - had episode overnight which was relieved with O2 supplementation   - currently on RA; monitoring     • Advanced dementia [F03.90]  - cont outpt meds     Accelerated Hypertension  - started on Amlodipine for now; monitoring    Core Measures

## 2017-04-11 NOTE — DISCHARGE PLANNING
Medical Social Work    Referral: Pt discussed at IDT rounds this AM.    Intervention: Per flowsheet, pt lives with spouse and expects to d.c home.  PT and OT recommended SNF; however, due to pt's observation status, pt is only eligible for HH.    Plan: SHREE contacted pt's daughter, Tiarra as pt is disoriented to obtain HH choice ([facility]).  Tiarra stated that with her mother's dementia, going back to [facility] since it is familiar would be better with HH.  SHREE faxed choice form to [facility] for referral.

## 2017-04-12 NOTE — PROGRESS NOTES
Continues to rest. Respirations even and unlabored. Repositioned. IV infusing without issue. Bed alarm remains on for patient safety. Call light in reach.     Tele Report:   Sinus Rhythm  Rare PVC/ Occ PAC  HR 60-70  ME 0.20  QRS 0.08  QT 0.40

## 2017-04-12 NOTE — PROGRESS NOTES
Resting with eyes closed. Respirations even and unlabored. Repositioned. New bag IVF hung and infusing without issue. Call light in reach. Bed alarm on for safety.

## 2017-04-12 NOTE — THERAPY
"Physical Therapy Treatment completed.   Bed Mobility:  Supine to Sit: Moderate Assist  Transfers: Sit to Stand: Moderate Assist (of 2)  Gait: Level Of Assist: Moderate Assist (of 2) with Front-Wheel Walker       Plan of Care: Will benefit from Physical Therapy 7 times per week  Discharge Recommendations: Equipment: Gait Belt ordered.. Post-acute therapy Discharge to a transitional care facility for continued skilled therapy services.    Pt able to mobilize to bedside chair w/ mod A of 2 using the FWW. She requires assist with ALL mobility. Posterior lean in standing and fearful of falling. RN and daughter present and aware. PT recommends post acute therapy prior to return to home, however plan is back to [facility] services and home health as patient does not have a qualifying stay. RN updated and aware of PT recommendations and patient's current mobility level.      See \"Rehab Therapy-Acute\" Patient Summary Report for complete documentation.       "

## 2017-04-12 NOTE — PROGRESS NOTES
Hospital Medicine Progress Note, Adult, Complex               Author: Yunior Justin Date & Time created: 4/12/2017  8:14 AM     Interval History:  ID: 89yo F with advanced dementia living in assisted living and found down with presumed syncope.    Today:  Spoke to patient's daughter Vanesa.  We discussed available care at [facility].  Currently patient is requiring assist to stand and will require home health.  WE discussed her bladder meds as possible source of weakness as well as acute worsening of her dementia.  Patient states being a bit anxious and scare about where she will go from the hospital.    Review of Systems:  Review of Systems   Constitutional: Negative for diaphoresis.   Respiratory: Negative for shortness of breath.    Cardiovascular: Negative for leg swelling.   Gastrointestinal: Negative for nausea and abdominal pain.   Genitourinary: Negative for frequency.        Urinary retention   Musculoskeletal: Negative for joint pain.   Neurological: Positive for weakness. Negative for speech change and headaches.   Psychiatric/Behavioral: The patient is not nervous/anxious.        Physical Exam:  Physical Exam   Constitutional: She is oriented to person, place, and time. No distress.   HENT:   Head: Normocephalic and atraumatic.   Nose: Nose normal.   Mouth/Throat: Oropharynx is clear and moist. No oropharyngeal exudate.   Eyes: Conjunctivae and EOM are normal. Right eye exhibits no discharge. Left eye exhibits no discharge. No scleral icterus.   Neck: Normal range of motion. No tracheal deviation present.   Cardiovascular: Normal rate, regular rhythm, normal heart sounds and intact distal pulses.    No murmur heard.  Pulmonary/Chest: Effort normal and breath sounds normal. No stridor. No respiratory distress. She has no wheezes.   Abdominal: Soft. Bowel sounds are normal. She exhibits no distension. There is no tenderness. There is no rebound.   Musculoskeletal: She exhibits no edema.    Lymphadenopathy:     She has no cervical adenopathy.   Neurological: She is alert and oriented to person, place, and time. No cranial nerve deficit. Coordination (generalized weakness) abnormal.   Skin: Skin is warm and dry. She is not diaphoretic.   Psychiatric: She has a normal mood and affect. Her behavior is normal.   Vitals reviewed.      Labs:        Invalid input(s): [id number]      Recent Labs      04/10/17   0543   SODIUM  144   POTASSIUM  4.0   CHLORIDE  116*   CO2  22   BUN  16   CREATININE  0.90   CALCIUM  8.3*     Recent Labs      04/10/17   0543   GLUCOSE  82     Recent Labs      04/10/17   0543   RBC  4.15*   HEMOGLOBIN  12.9   HEMATOCRIT  41.7   PLATELETCT  SEE NOTE     Recent Labs      04/10/17   0543   WBC  8.1   NEUTSPOLYS  69.30   LYMPHOCYTES  18.30*   MONOCYTES  8.30   EOSINOPHILS  2.80   BASOPHILS  0.90           Hemodynamics:  Temp (24hrs), Av.6 °C (97.8 °F), Min:36.3 °C (97.3 °F), Max:37.1 °C (98.7 °F)  Temperature: 36.3 °C (97.4 °F)  Pulse  Av.5  Min: 65  Max: 95   Blood Pressure : (!) 165/59 mmHg     Respiratory:    Respiration: 18, Pulse Oximetry: 93 %        RUL Breath Sounds: Diminished, RML Breath Sounds: Diminished, RLL Breath Sounds: Diminished, JAMES Breath Sounds: Diminished, LLL Breath Sounds: Diminished  Fluids:    Intake/Output Summary (Last 24 hours) at 17 0814  Last data filed at 17 0600   Gross per 24 hour   Intake   1450 ml   Output    500 ml   Net    950 ml        GI/Nutrition:  Orders Placed This Encounter   Procedures   • Diet Order     Standing Status: Standing      Number of Occurrences: 1      Standing Expiration Date:      Order Specific Question:  Diet:     Answer:  Regular [1]     Medical Decision Making, by Problem:  Active Hospital Problems    Diagnosis   • Weakness [R53.1]  - UA negative.  ESR ordered and unremarkable   - PT/OT  - Home health ordered     • Near syncope [R55]  - orthostatic BP check in am.  - normal TSH and cortisol  -  PT/OT.  Echocardiogram unremarkable.     • Acute respiratory failure with hypoxia (CMS-HCC) [J96.01]     • Advanced dementia [F03.90]  - continue memantine       *  Urinary retention:        - mirabegron discontinued yesterday      *  Hypertension:        - due to dementia and age dropping her BP too low likely more harmful, will allow for current BP but keep SBP <160 if able.     Medications reviewed and Labs reviewed  Taylor catheter: No Taylor      DVT Prophylaxis: Heparin

## 2017-04-12 NOTE — CARE PLAN
Problem: Safety  Goal: Will remain free from injury  Outcome: PROGRESSING AS EXPECTED  Oriented to room and equipment. Call light instruction given as needed, in reach at all times. Slipper socks in place. Floor dry and uncluttered. Bed alarm on for patient safety.     Problem: Skin Integrity  Goal: Risk for impaired skin integrity will decrease  Outcome: PROGRESSING AS EXPECTED  Encouraged turning and repositioning every two hours while awake, offered staff assistance if needed. Skin assessed every shift and as needed.

## 2017-04-12 NOTE — PROGRESS NOTES
Resting on and off. Respirations even and unlabored. Disoriented to time, location, and situation when awoken. IV infusing without issue. Refuse heparin. No c/o. Call light in reach.

## 2017-04-12 NOTE — PROGRESS NOTES
Report received. Care assumed. Up to cardiac chair. Alert, disoriented to situation. No c/o. Respirations even and unlabored. IV infusing without issue. Call light in reach.

## 2017-04-12 NOTE — DISCHARGE PLANNING
Medical Social Work    Referral: Pt discussed at IDT rounds.      Intervention: Dr. Justin stated he entered HH order but it is not showing up on workqueue.      Plan: SHREE printed out F2F and order and faxed to [facility].    [facility] accepted.

## 2017-04-12 NOTE — PROGRESS NOTES
Due medications given without issue. 2 person assist to BSC and bed. Positioned for comfort. IV infusing without issue. Call light in reach. Bed alarm on for patient safety.

## 2017-04-12 NOTE — THERAPY
"Occupational Therapy Treatment completed with focus on ADLs, ADL transfers, patient education, caregiver training, cognition and upper extremity function.  Functional Status:  Confusion present. Pleasant and cooperative. Fearful of mobility. Min-MaxA with functional mobility. Feeds self with set-up. Requires Min-MaxA with self-cares.   Plan of Care: Will benefit from Occupational Therapy 3 times per week  Discharge Recommendations:  Equipment Will Continue to Assess for Equipment Needs. Post-acute therapy Discharge to a transitional care facility for continued skilled therapy services.  Pt requires 24/7 Sup. High risk for falls.    See \"Rehab Therapy-Acute\" Patient Summary Report for complete documentation.   "

## 2017-04-12 NOTE — PROGRESS NOTES
Received bedside report from Ines NEWELL. Patient states that she does not have any pain. Patient is confused as to where she is and where her family is. Bed alarm is on and call light is within reach.

## 2017-04-12 NOTE — DISCHARGE PLANNING
SHREE Collazo received a call from Sidra at [facility] who stated the Xarelto went through just fine with no co pay.

## 2017-04-12 NOTE — FACE TO FACE
Face to Face Supporting Documentation - Home Health    The encounter with this patient was in whole or in part the primary reason for home health admission.    Date of encounter:   Patient:                    MRN:                       YOB: 2017  Cely Knowles  [id number]  5/24/1924     Home health to see patient for:  Skilled Nursing care for assessment, interventions & education and Physical Therapy evaluation and treatment    Skilled need for:  Recent Deterioration of Health Status Lower leg weakness. Recent questionable syncope    Skilled nursing interventions to include:  Comment: Strengthening    Homebound status evidenced by:  Needs the assistance of another person in order to leave the home. Leaving home requires a considerable and taxing effort. There is a normal inability to leave the home.    Community Physician to provide follow up care: Aimee Zendejas PA-C     Optional Interventions? No      I certify the face to face encounter for this home health care referral meets the CMS requirements and the encounter/clinical assessment with the patient was, in whole, or in part, for the medical condition(s) listed above, which is the primary reason for home health care. Based on my clinical findings: the service(s) are medically necessary, support the need for home health care, and the homebound criteria are met.  I certify that this patient has had a face to face encounter by myself.  Yunior Justin D.O. - NPI: [id number]

## 2017-04-13 PROBLEM — R55 NEAR SYNCOPE: Status: RESOLVED | Noted: 2017-01-01 | Resolved: 2017-01-01

## 2017-04-13 PROBLEM — J96.01 ACUTE RESPIRATORY FAILURE WITH HYPOXIA (HCC): Status: RESOLVED | Noted: 2017-01-01 | Resolved: 2017-01-01

## 2017-04-13 PROBLEM — I10 HYPERTENSION: Status: ACTIVE | Noted: 2017-01-01

## 2017-04-13 NOTE — PROGRESS NOTES
NO acute changes in pt status. Pt is sleeping with RR even and unlabored. Call light in reach and bed alarm is set-- monitoring.     Telemetry Report: pt has been SR/SB.  HR: 50s-70s  Measurements: (20/08/36)  Ectopy: r PVC, occ PAC    Measurements and updates per Heide BULL.

## 2017-04-13 NOTE — PROGRESS NOTES
Up to commode x2 assist w/FWW. Voided 700 ml yellow urine. Transferred to cardiac chair, safety precautions in place.

## 2017-04-13 NOTE — DIETARY
Nutrition Services follow-up:  Diet Rx:  Regular.  Patient's PO intake is variable.  PO intake ranges from less than 25% to 50-75%.  Overall, average intake is ~25-50% of meals.  She is also receiving chocolate milkshakes twice a day.  No new weight to assess.  Plan/Recommendations:  1)  Nutrition Services to continue to work with patient/family for food preferences.  2)  RD to monitor PO intake.

## 2017-04-13 NOTE — DISCHARGE INSTRUCTIONS
Discharge Instructions    Discharged to home by medical transportation with relative. Discharged via wheelchair, hospital escort: Yes.  Special equipment needed: Not Applicable    Be sure to schedule a follow-up appointment with your primary care doctor or any specialists as instructed.     Discharge Plan:   Diet Plan: Discussed  Activity Level: Discussed  Confirmed Follow up Appointment: Patient to Call and Schedule Appointment  Confirmed Symptoms Management: Discussed  Medication Reconciliation Updated: Yes  Influenza Vaccine Indication: Not indicated: Previously immunized this influenza season and > 8 years of age    I understand that a diet low in cholesterol, fat, and sodium is recommended for good health. Unless I have been given specific instructions below for another diet, I accept this instruction as my diet prescription.   Other diet: as tolerated    Special Instructions: None    · Is patient discharged on Warfarin / Coumadin?   No     · Is patient Post Blood Transfusion?  No  Near-Syncope  Near-syncope (commonly known as near fainting) is sudden weakness, dizziness, or feeling like you might pass out. During an episode of near-syncope, you may also develop pale skin, have tunnel vision, or feel sick to your stomach (nauseous). Near-syncope may occur when getting up after sitting or while standing for a long time. It is caused by a sudden decrease in blood flow to the brain. This decrease can result from various causes or triggers, most of which are not serious. However, because near-syncope can sometimes be a sign of something serious, a medical evaluation is required. The specific cause is often not determined.  HOME CARE INSTRUCTIONS   Monitor your condition for any changes. The following actions may help to alleviate any discomfort you are experiencing:  · Have someone stay with you until you feel stable.  · Lie down right away and prop your feet up if you start feeling like you might faint. Breathe  deeply and steadily. Wait until all the symptoms have passed. Most of these episodes last only a few minutes. You may feel tired for several hours.    · Drink enough fluids to keep your urine clear or pale yellow.    · If you are taking blood pressure or heart medicine, get up slowly when seated or lying down. Take several minutes to sit and then stand. This can reduce dizziness.  · Follow up with your health care provider as directed.   SEEK IMMEDIATE MEDICAL CARE IF:   · You have a severe headache.    · You have unusual pain in the chest, abdomen, or back.    · You are bleeding from the mouth or rectum, or you have black or tarry stool.    · You have an irregular or very fast heartbeat.    · You have repeated fainting or have seizure-like jerking during an episode.    · You faint when sitting or lying down.    · You have confusion.    · You have difficulty walking.    · You have severe weakness.    · You have vision problems.    MAKE SURE YOU:   · Understand these instructions.  · Will watch your condition.  · Will get help right away if you are not doing well or get worse.     This information is not intended to replace advice given to you by your health care provider. Make sure you discuss any questions you have with your health care provider.     Document Released: 12/18/2006 Document Revised: 12/23/2014 Document Reviewed: 05/23/2014  [employer] Interactive Patient Education ©2016 [employer]      Depression / Suicide Risk    As you are discharged from this [facility] facility, it is important to learn how to keep safe from harming yourself.    Recognize the warning signs:  · Abrupt changes in personality, positive or negative- including increase in energy   · Giving away possessions  · Change in eating patterns- significant weight changes-  positive or negative  · Change in sleeping patterns- unable to sleep or sleeping all the time   · Unwillingness or inability to communicate  · Depression  · Unusual  sadness, discouragement and loneliness  · Talk of wanting to die  · Neglect of personal appearance   · Rebelliousness- reckless behavior  · Withdrawal from people/activities they love  · Confusion- inability to concentrate     If you or a loved one observes any of these behaviors or has concerns about self-harm, here's what you can do:  · Talk about it- your feelings and reasons for harming yourself  · Remove any means that you might use to hurt yourself (examples: pills, rope, extension cords, firearm)  · Get professional help from the community (Mental Health, Substance Abuse, psychological counseling)  · Do not be alone:Call your Safe Contact- someone whom you trust who will be there for you.  · Call your local CRISIS HOTLINE 634-8560 or 384-244-6183  · Call your local Children's Mobile Crisis Response Team [place] (044) 970-3398 or www.orderTopia  · Call the toll free National Suicide Prevention Hotlines   · National Suicide Prevention Lifeline 385-232-TOUK (0012)  · National Hope Line Network 800-SUICIDE (298-2794)

## 2017-04-13 NOTE — CARE PLAN
Problem: Safety  Goal: Will remain free from falls  Outcome: PROGRESSING AS EXPECTED  Pt educated regarding the use of call light when needing assistance. Pt demonstrated and verbalized the proper use of a call light and to call for assistance. Fall precautions in place, treaded slippers on, personal belongings/phone in reach. Pt is very unsteady and weak, requiring 2-3 person assistance the BSC. Pt is not impulsive to get OOB tonight, but bed alarm remains set.     Problem: Skin Integrity  Goal: Risk for impaired skin integrity will decrease  Outcome: PROGRESSING AS EXPECTED  Pt unable to turn self, and needs reminding and support with pillows. Educated regarding q2 hour turning to decrease possibility of skin breakdown. Heels floated on pillows. Pt repositioned every 2 hours with wendie-care provided PRN incontinence. No new skin break down noted over bony prominences.

## 2017-04-13 NOTE — PROGRESS NOTES
Received report from day shift RN; care assumed. Pt is sitting in cardiac chair with chair alarm in place. Denies any needs or concerns. CLIP, pt calling for assistance, fall precautions in place, will CTM.

## 2017-04-13 NOTE — THERAPY
"Physical Therapy Treatment completed.   Bed Mobility:  Supine to Sit: Moderate Assist  Transfers: Sit to Stand: Maximal Assist (2 people)  Gait: Level Of Assist: Moderate Assist (of 2) with Front-Wheel Walker       Plan of Care: Will benefit from Physical Therapy 5 times per week  Discharge Recommendations: Equipment: No Equipment Needed. Post-acute therapy Discharge to a transitional care facility for continued skilled therapy services.    Pt continues to require mod to max A of 2 for standing and transfers. Nursing, pt and family aware. Pt will need assist w/ all mobility upon DC. Per RN, possible Hospice consult upon return to [facility]. Home Health is also set up.      See \"Rehab Therapy-Acute\" Patient Summary Report for complete documentation.       "

## 2017-04-13 NOTE — DISCHARGE PLANNING
Medical Social Work    Referral: Pt discussed at IDT rounds this AM.    Intervention: Per flowsheet, pt lives at [facility] and expects to d.c back there. Order has been received for Hospice.    Plan: 1000 - SW called pt's daughter, Vanesa and left voicemail requesting call back.  RE: Obtain Hospice choice so referral can be sent.

## 2017-04-13 NOTE — CARE PLAN
Problem: Safety  Goal: Will remain free from injury  Outcome: PROGRESSING AS EXPECTED  Hourly rounding and re-orientation. Call light and personal possessions within reach. Pt encouraged to call for assistance. Bed in low position, bed alarm in use. Treaded slipper socks on pt. Appropriate signs in place.    Problem: Skin Integrity  Goal: Risk for impaired skin integrity will decrease  Outcome: PROGRESSING AS EXPECTED  Noel and skin assessment completed. Q2h turns utilized, and verbalizes rationale for turning/repositioning. Draw sheet in use for assistance with repositioning. Moisturizer and barrier cream available and in use.

## 2017-04-13 NOTE — DISCHARGE PLANNING
[facility] received a Hospice choice form. Per the choice form the referral has been sent to [facility].

## 2017-04-13 NOTE — PROGRESS NOTES
Bedside report [facility] from [facility] nurseKandy RN. Assumed care. Pt resting in bed.

## 2017-04-13 NOTE — PROGRESS NOTES
Assessment completed--see doc flowsheet. A&O to self only; calm and cooperative. Denies pain. Neuro check per doc flowsheet. POC discussed, verbalized understanding. Call light and personal possessions within reach, bed in low position, bed alarm on, encouraged to call for assistance.

## 2017-04-13 NOTE — PROGRESS NOTES
Pt is oriented to self only, very anxious and required staff to speak with daughter, Vanesa, to calm pt down. After speaking with daughter, pt is more cooperative. Assisted to the BSC with 3 person assistance-- able to void and then assisted back to bed. Pt denies any pain tonight. Tolerated pills one at a time in high london's position. No other needs or complaints. Pt is resting in bed with bed alarm set.

## 2017-04-13 NOTE — DISCHARGE PLANNING
SHREE Collazo met with pt's daughter, Vanesa jose Hayes, Director of [facility] to discuss that HH will be in place until Hospice can evaluate to see if a candidate for Hospice.  SHREE faxed choice form for [facility] to [facility] for referral and called to leave a message that pt is d.c to [facility] today if she can notify HH to see pt until Hospice can evaluate.  SHREE will follow up and give pt's daughter her card.  SHREE notified Hosp RN Debbie and Dr. Justin that pt's daughter is bedside and would like to meet with them.

## 2017-04-13 NOTE — PROGRESS NOTES
Discharge instructions and prescriptions explained & provided to patient and family. Verbalized understanding. IV removed, tip intact. Pt discharged to home ([facility]) with all personal belongings via wheelchair.     Tele note:  Sinus rhythm; occas PVC/PAC  .20/ .08/ .40

## 2017-04-15 NOTE — DISCHARGE SUMMARY
PRIMARY CARE PHYSICIAN:  Aimee Zendejas as well as Tray Baca.    DISCHARGE DIAGNOSES:  1.  Ongoing weakness, likely secondary to immobility and advancing dementia.  2.  Questionable near syncope with negative workup during course of   hospitalization, likely secondary to weakness.  Negative orthostatics.  3.  Advanced dementia.  4.  Urinary retention during course of hospitalization with a history of   urinary incontinence prior.  She is on anti-incontinence medication.  5.  Hypertension.    IMAGING/PROCEDURES:  CT of the head on 04/08/2017 showed atrophy, decreased   attenuation in the periventricular white matter, likely indicating   microvascular ischemic change, but no acute abnormalities.  Chest x-ray on   04/08/2017 showed no acute cardiac or pulmonary abnormalities identified,   hiatal hernia and calcified granulomas. Echocardiogram on 04/09/2017 showed   ejection fraction of 65%, moderate LVH.  Aortic sclerosis without stenosis,   trace aortic insufficiency, mild mitral regurgitation.    COURSE OF HOSPITALIZATION:  This is a 89-year-old female.  She lives at [facility] facility.  She was being assisted at home and   reportedly had a fall.  There is question of syncopal episode.  Patient has   advanced dementia, very poor historian.  She was brought in to the hospital.    During her course of hospitalization, workup was negative for orthostatics.    She remained significantly weak, does have difficulty standing.  On further   discussion with the daughter and further discussion with a medical assistant   at [facility], patient has had ongoing leg weakness, this is not something   new.  Daughter had agreed to that on further discussion.  During her course of   hospitalization, the patient was very pleasant, although extremely forgetful.    She had poor insight to current medical issues.  Daughter and [facility]   was very helpful.  Daughter had talked to [facility].  They felt  capable of   taking care of the patient at home.  Patient's daughter not had further   discussions.  She did wish for consideration of hospice evaluation.  She did   not wish the patient to go to a skilled nursing care.  As [facility] felt   they are capable of caring for patient, had evaluated here and patient's   daughter had requested hospice, which we have arranged.  We will be sending   her back to [facility].  Patient was pleasantly demented.  She was awake.    She was able to eat.    The patient did have some urinary retention during the hospitalization, has   felt some of this might be secondary to her mirabegron.  This is decreased   from her extended release of 50 mg down to 25 mg.  I did discuss this with   daughter.    LABORATORY DATA:  As follows:  White count was unremarkable on 04/08/2017,   some mild macrocytosis, normal platelet count, normal sed rate.  Comprehensive   metabolic panel on 04/08/2017 showed some mild renal insufficiency with a BUN   of 29, creatinine of 1.23 with mild gentle fluids.  This did improve with the   BUN to 16, creatinine to 0.9.  Cardiac enzymes are unremarkable as well.    Mild decrease in albumin of 3.1.  Coagulation studies were unremarkable.    Urinalysis did not show any bladder infection.  Cortisol level was checked at   16.8, deemed adequate.  TSH is 2.92.    DISCHARGE CONDITION:  Weakened chronically, but stable.     DISCHARGE FOLLOWUP:  Patient will be following up with ongoing care at [facility] as well as hospice per daughter's pick.  It sounds like she is going   with [facility].    DISCHARGE MEDICATIONS:  1.  Calcium citrate/vitamin D3 350/250 units 2 tablets twice a day.  2.  Vitamin C 1000 mg every evening.  3.  Risperdal 0.25 mg at bedtime.  4.  Milk of magnesia 4 tablets every 12 hours as needed for constipation.  5.  Artificial tears as needed.  6.  Mobic 7.5 mg a day.  7.  Tylenol 500 mg twice a day as needed for fever or mild pain.  8.   Vitamin D3 2000 units daily.  9.  Aspirin 81 mg a day.  10.  Bactroban ointment as needed.  11.  Mirabegron extended release 25 mg, this is a decrease from her 50 mg.    Daughter was aware of that and will be watching this.  12.  Amantadine ____ mg daily.    Please note that 32 minutes spent on discharge process.       ____________________________________     DO DEMETRI STOVALL / SHRAVAN    DD:  04/14/2017 08:20:26  DT:  04/14/2017 13:13:18    D#:  [id number]  Job#:  [id number]    cc: CAMACHO PENA MD

## 2017-07-21 PROBLEM — N17.9 AKI (ACUTE KIDNEY INJURY) (HCC): Status: ACTIVE | Noted: 2017-01-01

## 2017-07-21 PROBLEM — R65.21 SEPTIC SHOCK(785.52): Status: ACTIVE | Noted: 2017-01-01

## 2017-07-21 PROBLEM — R73.9 HYPERGLYCEMIA: Status: ACTIVE | Noted: 2017-01-01

## 2017-07-21 PROBLEM — K31.1 GASTRIC OUTLET OBSTRUCTION: Status: ACTIVE | Noted: 2017-01-01

## 2017-07-21 PROBLEM — J69.0 ASPIRATION PNEUMONIA (HCC): Status: ACTIVE | Noted: 2017-01-01

## 2017-07-21 PROBLEM — A41.9 SEPSIS, UNSPECIFIED: Status: ACTIVE | Noted: 2017-01-01

## 2017-07-21 PROBLEM — J96.01 ACUTE RESPIRATORY FAILURE WITH HYPOXIA (HCC): Status: ACTIVE | Noted: 2017-01-01

## 2017-07-21 NOTE — PROGRESS NOTES
Pt transferred from ed via stretcher daughter at bedside color pale NGT intact with dark brown liq  Skin check performed no open areas or redness noted

## 2017-07-21 NOTE — ASSESSMENT & PLAN NOTE
Due to aspiration PNA due to Gastric outlet obstruction causing vomiting. No aggressive trmt per family whishes.   IV abx ceftriaxone/azithro  IV Fluid resuscitation

## 2017-07-21 NOTE — H&P
Hospital Medicine History and Physical    Date of Service  7/21/2017    Chief Complaint  Chief Complaint   Patient presents with   • N/V     since last night   • Cough     yesterday       History of Presenting Illness  89 y.o. female w hx of advanced dementia, HTN who lives at an [facility] presented 7/21/2017 with abdominal pain, vomiting and cough.    History of obtained from daughter who is at the bedside as patient is confused, poor memory from baseline dementia and also got versed for NGT placement.    Per daughter, the patient developed diffuse abdominal pain yesterday, which soon progressed to involve vomiting of dark colored material.  She continued to have multiple episodes of throwing up.  She had no tera blood.  Daughter states she has had constipation for the last several weeks, no diarrhea.  No complaints of pain with urination.  No c/o fever.  This morning she still had pain and vomiting and developed an intractable cough.  Daughter brought her in for evaluation.    Confirmed patient is DNR, no ICU or central lines.     ER course: Versed given, CT scan revealed gastric outlet obstruction, and large paraesophageal hernia, volvulus not excluded.  Patchy left base opacities.  NGT was placed for decompression.        Primary Care Physician  Aimee Zendejas PA-C    Consultants  None    Code Status  DNR - no intubation, no ICU, no pressors, no central line    Review of Systems  Review of Systems   Constitutional: Positive for malaise/fatigue. Negative for fever and chills.   HENT: Negative for sore throat.    Respiratory: Positive for cough. Negative for shortness of breath.    Cardiovascular: Negative for chest pain and palpitations.   Gastrointestinal: Positive for nausea, vomiting and abdominal pain. Negative for heartburn, diarrhea and blood in stool.   Genitourinary: Negative for dysuria and frequency.   Musculoskeletal: Negative for myalgias and back pain.   Neurological: Positive for weakness. Negative  for dizziness, focal weakness and headaches.   Psychiatric/Behavioral: Negative for depression and hallucinations.   All other systems reviewed and are negative.         Past Medical History  Past Medical History   Diagnosis Date   • Dementia    • HTN (hypertension)        Surgical History  Past Surgical History   Procedure Laterality Date   • Primary c section         Medications  No current facility-administered medications on file prior to encounter.     Current Outpatient Prescriptions on File Prior to Encounter   Medication Sig Dispense Refill   • Mirabegron ER 25 MG TABLET SR 24 HR Take 25 mg by mouth every day. 30 Tab 1   • ascorbic acid (VITAMIN C) 1000 MG tablet Take 1,000 mg by mouth every evening. Indications: supplement     • artificial tears 1.4 % Solution Place 1 Drop in both eyes 4 times a day. Indications: dry eyes     • Memantine HCl ER (NAMENDA XR) 28 MG CAPSULE SR 24 HR Take 28 mg by mouth every day at 6 PM. Indications: Dementia     • aspirin 81 MG tablet Take 81 mg by mouth every evening. Indications: anticoagulant         Family History  No known history of cancer, stroke, diabetes    Social History  Social History   Substance Use Topics   • Smoking status: Not on file   • Smokeless tobacco: Not on file   • Alcohol Use: Not on file       Allergies  No Known Allergies     Physical Exam  Laboratory   Hemodynamics  Temp (24hrs), Av.8 °C (98.3 °F), Min:36.8 °C (98.3 °F), Max:36.8 °C (98.3 °F)   Temperature: 36.8 °C (98.3 °F)  Pulse  Av.8  Min: 85  Max: 96 Heart Rate (Monitored): 86  Blood Pressure : 129/70 mmHg, NIBP: (!) 93/44 mmHg      Respiratory      Respiration: (!) 26, Pulse Oximetry: 99 %             Physical Exam   Constitutional: She appears well-developed and well-nourished. She appears toxic. She appears ill. She appears distressed.   HENT:   Head: Macrocephalic.   Neck: No JVD present. No thyromegaly present.   Cardiovascular: Regular rhythm.    No murmur heard.  Tachycardic    Pulmonary/Chest: She is in respiratory distress. She has rales.   Crackles on L, ronchi, tachypnic, increased work of breathing   Abdominal: She exhibits distension. There is tenderness. There is no guarding.   Musculoskeletal: She exhibits no edema or tenderness.   Neurological: No cranial nerve deficit.   Skin: Skin is warm and dry. No rash noted. No erythema. There is pallor.   Psychiatric:   Somnolent       Recent Labs      07/21/17   0925   WBC  16.4*   RBC  4.68   HEMOGLOBIN  14.4   HEMATOCRIT  44.7   MCV  95.5   MCH  30.8   MCHC  32.2*   RDW  48.0   PLATELETCT  256   MPV  13.1*     Recent Labs      07/21/17   0925   SODIUM  147*   POTASSIUM  4.9   CHLORIDE  104   CO2  29   GLUCOSE  173*   BUN  39*   CREATININE  1.82*   CALCIUM  9.7     Recent Labs      07/21/17   0925   ALTSGPT  17   ASTSGOT  34   ALKPHOSPHAT  48   TBILIRUBIN  1.1   GLUCOSE  173*                 Lab Results   Component Value Date    TROPONINI <0.02 04/08/2017       Imaging  Ct-abdomen-pelvis W/o  7/21/2017  1. 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 A component of volvulus cannot be entirely excluded. 2. Atrophic kidneys. Nonobstructive left renal calculi. Large left kidney cyst. 3. Patchy left base opacities, atelectasis versus consolidation.    Dx-chest-portable (1 View)  7/21/2017  1. No pulmonary infiltrates or consolidations are noted.     Assessment/Plan     I anticipate this patient will require at least two midnights for appropriate medical management, necessitating inpatient admission.    * Septic shock (CMS-[facility])  Assessment & Plan  Due to aspiration PNA due to Gastric outlet obstruction causing vomiting.  Lactate was >5, BP dropped after NGT (to 70s) but patient and family do not want ICU or vasopressors.  They also do not want surgery for hiatal hernia (causing obstruction) and do not want intubation  -IV abx ceftriaxone/azithro  -Cultures: urine, blood, sputum if able  -Trend lactate  -Fluid boluses,  no pressors  -No ICU    Advanced dementia (present on admission)  Assessment & Plan  On namenda, unable to get history from patient  -Namenda  -DNR, family is considering comfort care    SARAH (acute kidney injury) (CMS-HCC)  Assessment & Plan  Due to volume depletion and septic shock  -Fluid boluses  -Monitor Cr Daily  -Watch UOP  -Hold nephrotoxins  -Consider Renal US if no improvement  -Consider chavez    Gastric outlet obstruction  Assessment & Plan  Due to hiatal hernia, presented w emesis.  -NGT inserted and dark material being suctioned  -Trend hgb daily  -PPI IV (can't take PO)    Acute respiratory failure with hypoxia (CMS-HCC)  Assessment & Plan  Due to aspiration pneumonia, due to vomiting from gastric outlet obstruction.  She is requiring 2-4L O2  -No intubation  -Ceftriaxone/Azithro  -Sepsis protocol w IVF  -NGT placed to prevent further emesis (remove if comfort care)    Aspiration pneumonia (CMS-HCC)  Assessment & Plan  Due to vomiting, associated with sepsis and hypoxia  -O2  -Ceftriaxone/azithro  -NPO  -NGT  -RT protocol        VTE prophylaxis: heparin       35 minutes of critical care time were spent, including examination and interview of the patient, explanation of prognosis/diagnosis/plan of care, direction of nursing staff and discussion of the patient with other physicians involved.  This time was independent of procedures performed.    Greater than 50% of which was spent at the bedside.      35 minutes were spent discussing goals of care and code status with daughter at bedside.  She is clear that she does not want ICU care and does not want pressors or a central line.  She would like to attempt treatment with IVF and IV abx as well as allow NGT for decompression.  She would allow restraints if needed in order to prevent the patient from pulling the NGT.  This was in addition to the time of the initial visit with the patient for total time of 70min.  All questions answered.  Greater than 50% of  this time was at the patient's bedside.

## 2017-07-21 NOTE — ED NOTES
1120- daughter out to [place], pt vomiting and agitated. Pt vomiting a small amount of brown emesis. Congested coughing, breathing more labored. MD aware, order for NG tube placement. Daughter agrees to NGT placement.   1130- pt medicated with 1 MG of Versed as ordered, attempted to place NGT, pt uncooperative, met resistance, small amount of brown emesis in tube. O2 sat 77%, tube removed, O2 increased.  1140- NGT attempted 2nd time with success, 14F to R nare, approx 200 Ml of brown emesis by low suction. Pt hypotensive, MD aware, O2 sat improved to 91%.   1150- fluids increased to 500 ML/HR per Dr Tracy, admitting MD.   1235-pt currently 98% on 6L NC, BP has improved, see vitals record. Pt resting quietly with eyes closed, resp even less labored. Appears comfortable, opens eyes to verbal stimuli, not verbalizing at this time.

## 2017-07-21 NOTE — ED NOTES
Daughter at bedside, NS and ABX infusing, pt alert, confused. Requesting water, pt and daughter aware pt is NPO at this time.

## 2017-07-21 NOTE — ED NOTES
Med rec complete per MAR  Allergies reviewed    Will update when meds where taken last when I get that from [facility]

## 2017-07-21 NOTE — ED NOTES
"Chief Complaint   Patient presents with   • N/V     since last night   • Cough     yesterday     /70 mmHg  Pulse 85  Temp(Src) 36.8 °C (98.3 °F)  Resp 22  Ht 1.575 m (5' 2\")  Wt 48.081 kg (106 lb)  BMI 19.38 kg/m2  SpO2 97%  O2 sat 87% on RA, placed on 4L NC  "

## 2017-07-21 NOTE — ED PROVIDER NOTES
"ED Provider Note    CHIEF COMPLAINT  Chief Complaint   Patient presents with   • N/V     since last night   • Cough     yesterday       HPI  Cely Knolwes is a 89 y.o. female who presents with a cough. This began yesterday. Whenever she tries to drink, she starts coughing. She denies any nausea. She denies any diarrhea or stool changes. She does not feel short of breath. She does feel quite cold, this started yesterday as well. She has some pain in her toes which she cannot further elaborate upon. She denies any chest pain. No belly pain. She denies any leg pain or swelling. She reports no change in bladder. The patient lives at an assisted living facility. She was in hospital back in April with a failure to thrive/syncope diagnosis. It sounds like she's been fine until yesterday. There is no other complaint. The patient does arrive with a POLST specifying to allow natural death, to allow IV antibiotics. I confirmed this with her.    Further history is obtained from daughter. She saw her yesterday. She was throwing up. She did not have a cough at that time. Her vomit was dark brown, they attributed that to some chocolates and a Coca-Cola that she had had to drink. She is also having abdominal pain at that time.    PAST MEDICAL HISTORY  Dementia is mentioned in the chart    FAMILY HISTORY  No family history on file.    SOCIAL HISTORY      SURGICAL HISTORY  No past surgical history on file.    CURRENT MEDICATIONS    I have reviewed the nurses notes and/or the list brought with the patient.    ALLERGIES  No Known Allergies    REVIEW OF SYSTEMS  See HPI for further details. Review of systems as above, otherwise all other systems are negative.     PHYSICAL EXAM  VITAL SIGNS: /70 mmHg  Pulse 85  Temp(Src) 36.8 °C (98.3 °F)  Resp 22  Ht 1.575 m (5' 2\")  Wt 48.081 kg (106 lb)  BMI 19.38 kg/m2  SpO2 97%  Constitutional: She has a dry cough. Although she does not appear toxic, she is having some mildly " labored breathing.  HENT: Mucus membranes moist.  Oropharynx is clear.  Eyes: Pupils equally round.  No scleral icterus.   Neck: Full nontender range of motion.  Lymphatic: No cervical lymphadenopathy noted.   Cardiovascular: Regular heart rate and rhythm.  No murmurs, rubs, nor gallop appreciated.   Thorax & Lungs: Chest is nontender.  Lungs are relatively clear although she does have diminished air movement at the right base. No wheeze, rhonchi, nor rales.   Abdomen: Soft, with no tenderness, rebound nor guarding.  No mass, pulsatile mass, nor hepatosplenomegaly appreciated.  Skin: No purpura nor petechia noted.  Extremities/Musculoskeletal: No sign of trauma including her feet which are nontender.  Calves are nontender with no cords nor edema.  No Lisbeth's sign.  Pulses are intact all around.   Neurologic: Alert & oriented.  Strength and sensation is intact all around.  Gait is not assessed  Psychiatric: Normal affect appropriate for the clinical situation.    EKG  I interpreted this EKG myself.  This is a 12-lead study.  The rhythm is sinus with a rate of 86.  There are no ST segment nor T wave abnormalities.  Interpretation: No ST segment elevation myocardial infarction.    LABS  Labs Reviewed   LACTIC ACID - Abnormal; Notable for the following:     Lactic Acid 5.33 (*)     All other components within normal limits   CBC WITH DIFFERENTIAL - Abnormal; Notable for the following:     WBC 16.4 (*)     MCHC 32.2 (*)     MPV 13.1 (*)     Neutrophils-Polys 90.90 (*)     Lymphocytes 4.20 (*)     Neutrophils (Absolute) 14.94 (*)     Lymphs (Absolute) 0.69 (*)     All other components within normal limits   COMP METABOLIC PANEL - Abnormal; Notable for the following:     Sodium 147 (*)     Anion Gap 14.0 (*)     Glucose 173 (*)     Bun 39 (*)     Creatinine 1.82 (*)     All other components within normal limits   ESTIMATED GFR - Abnormal; Notable for the following:     GFR If  31 (*)     GFR If Non   "American 26 (*)     All other components within normal limits   BLOOD CULTURE    Narrative:     Per Hospital Policy: Only change Specimen Src: to \"Line\" if  specified by physician order.   BLOOD CULTURE    Narrative:     Per Hospital Policy: Only change Specimen Src: to \"Line\" if  specified by physician order.   LACTIC ACID   URINALYSIS   URINE CULTURE(NEW)         RADIOLOGY/PROCEDURES  I have reviewed the patient's film interpretations myself, and they are read out by the radiologist as:   CT-ABDOMEN-PELVIS W/O   Final Result         1. 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 A component of volvulus cannot be entirely excluded.      2. Atrophic kidneys. Nonobstructive left renal calculi. Large left kidney cyst.      3. Patchy left base opacities, atelectasis versus consolidation.      DX-CHEST-PORTABLE (1 VIEW)   Final Result         1. No pulmonary infiltrates or consolidations are noted.        .    MEDICAL RECORD  I have reviewed patient's medical record and pertinent results are listed above.    COURSE & MEDICAL DECISION MAKING  I have reviewed any medical record information, laboratory studies and radiographic results as noted above.  Patient presents with a cough, vomiting. The vomiting sounds to be related to her coughing. Although she is not febrile, she is hypoxic and has increased work of breathing. For this reason we will treat her empirically for pneumonia. Given that she is in assisted living as opposed to a nursing facility, has not had a hospitalization in over 3 months, we'll go ahead and cover her with community-acquired regimen. Her lactate returns elevated. The patient clearly wants no aggressive procedures, however, she is okay with mild intervention such as IV fluids and antibiotics. Given that she does not want to be intubated, we will give her gentle IV fluids as opposed to formal sepsis bolus. Her POLST requests no transfer to the ICU.    As noted above, further " history is obtained from the daughter, it sounds as if she may have started vomiting before coughing. We'll go ahead and obtain a CT scan of her belly. Given the vomiting preceding the cough, consideration for aspiration. The Augmentin should cover this obviously. Discussed with daughter that I am quite concerned about her, and this likely represents early sepsis. We have talked about treatment options, and at this point she agrees with the plan that I have outlined above. Her creatinine returns elevated, which is a new development for her, for this reason I spoke with CT we'll change to a noncontrasted study.    I spoke with Dr. Michaels about the study. She appears to have a gastric outlet obstruction likely from a paraesophageal hernia. I'm gone back and talked to the daughter. She reiterates the patient is not a candidate for surgery. For this reason I did not consult surgery. However, she is agreeable to an NG tube. I have asked the nurses to place this, giving her dose of Versed for anxiolysis prior to it.      FINAL IMPRESSION  1. Hypoxia    2. Cough    3. Renal insufficiency     4. Gastric outlet obstruction    4.Critical care time, 35 minutes, which includes obtaining history from chart, patient and family historians, examination of patient, reevaluation of patient, direction of nursing staff, and discussion with admitting and consulting physician. It does not include any procedures.     This dictation was created using voice recognition software.    Electronically signed by: Andreas Pryor, 7/21/2017 9:12 AM

## 2017-07-22 PROBLEM — E87.0 HYPERNATREMIA: Status: ACTIVE | Noted: 2017-01-01

## 2017-07-22 NOTE — DISCHARGE PLANNING
Medical SW    Referral: bedside RN requested Sw meet w/ family as pt is being transported by [facility].    Intervention: Sw met w/ family at bedside and provided resources for grief and breavement supports. Pt explained tasks to complete including how many death certificates to acquire, their cost and how to acquire more if needed for business regarding assets etc.      Plan: Sw to assist w/ d/c planning as needed.

## 2017-07-22 NOTE — PROGRESS NOTES
RN Night Shift Note:  Patient fairly sedated at start of shift.  She had been given 0.5mg ativan for aggitation and bilateral soft wrist restraints to prevent her from pulling off tubing or oxygen.  Her HR has been stable at around 90 all shift. SR w/ frequent PACs.  S1 S2.  Blood pressure found to be 63/32 early in shift.  RR called and 1500ml of NS fluid bolus given.  BP responded during bolus up to 94/58 but dropped to 78/39 shortly afterward where it has remained for the remainder of the shift.  NS running at 75ml/hr until AM labs resulted.   and [place] 114.  Hospitalist Dr. Emmanuel notified.  IVF changed to 1/2NS at 75ml/hr.   One very small incontinent void this shift.  Perhaps 20 to 50ml.  Bladder scanned at 0100 for 130.   Temp 99.4f axillary steadily all shift.  400ml of brown/black fluid in suction canister from NG tube this am.  Fluid continues to be removed w/ NG tube.       Patient went from 5L NC at start of shift to Non Rebreather at the end.  Sats w/ NRB 94% steady.  RR 28 steady.       Patient's daughter and VIKAS Alexis was here, left, and then returned last night.  She will be back this am.  The plan is to make her mother comfort care.  .

## 2017-07-22 NOTE — RESPIRATORY CARE
Interval Note:    21:35 Called by RN to bedside due to pt remaining hypoxic on 15 lpm Oxy mask, Bronchodilator SVN administered at this time and NT suction completed, secretions clear with brown spects aspirated, Pt sats did not improve post procedure, Pt at this time was placed on % at 15 lpm pt sats increased to 93%. RN made aware. Breath sounds improved on right lung and left lung remains decreased with fine crackles. RN paged MD at this time for orders. Will continue to monitor

## 2017-07-22 NOTE — PROGRESS NOTES
[facility] Hospitalist Progress Note    Date of Service: 2017    Chief Complaint  89 y.o. female hx of advanced dementia , Htn admitted 2017 with N/V cough, lethargy .  Dx septic shock, asp pna, acute hypoxic resp failure - DNR     Interval Problem Update    Now unresponsive -Worsening  Resp failure on NRB mask.   cc dark gastric output.     Consultants/Specialty  None    Disposition  Dnr - to transition to comfort care.        Review of Systems   Unable to perform ROS: medical condition      Physical Exam  Laboratory/Imaging   Hemodynamics  Temp (24hrs), Av.9 °C (98.5 °F), Min:36.2 °C (97.2 °F), Max:37.4 °C (99.4 °F)   Temperature: 36.8 °C (98.2 °F)  Pulse  Av.9  Min: 85  Max: 96 Heart Rate (Monitored): 88  Blood Pressure : (!) 97/44 mmHg, NIBP: (!) 96/69 mmHg      Respiratory      Respiration: (!) 22, Pulse Oximetry: 98 %, O2 Daily Delivery Respiratory : OxyMask     Given By:: Mask, Work Of Breathing / Effort: Shallow  RUL Breath Sounds: Crackles, RML Breath Sounds: Crackles, RLL Breath Sounds: Crackles, JAMES Breath Sounds: Crackles, LLL Breath Sounds: Crackles    Fluids    Intake/Output Summary (Last 24 hours) at 17 0846  Last data filed at 17   Gross per 24 hour   Intake    500 ml   Output      0 ml   Net    500 ml       Nutrition  Orders Placed This Encounter   Procedures   • Diet NPO     Standing Status: Standing      Number of Occurrences: 1      Standing Expiration Date:      Order Specific Question:  Restrict to:     Answer:  Strict [1]     Physical Exam   Constitutional:   Tachypnea. Unresponsive    HENT:   ngt tube present.    Cardiovascular:   Tachy regular    Pulmonary/Chest: No stridor.   Increase RR, accessory muscle use.    Abdominal: Soft. She exhibits distension.   Musculoskeletal: She exhibits no edema.   Neurological: She is alert.   No spont ext movement    Skin: She is diaphoretic. There is pallor.   Peripheral cyanosis        Recent Labs      17    0925 07/22/17   0449   WBC  16.4*  6.7   RBC  4.68  3.61*   HEMOGLOBIN  14.4  11.2*   HEMATOCRIT  44.7  36.4*   MCV  95.5  100.8*   MCH  30.8  31.0   MCHC  32.2*  30.8*   RDW  48.0  52.3*   PLATELETCT  256  155*   MPV  13.1*  12.7     Recent Labs      07/21/17 0925 07/22/17   0449   SODIUM  147*  151*   POTASSIUM  4.9  4.8   CHLORIDE  104  114*   CO2  29  27   GLUCOSE  173*  114*   BUN  39*  52*   CREATININE  1.82*  2.37*   CALCIUM  9.7  7.5*         Recent Labs      07/21/17   0925   BNPBTYPENAT  140*              Assessment/Plan     * Septic shock (CMS-HCC)  Assessment & Plan  Due to aspiration PNA due to Gastric outlet obstruction causing vomiting. No aggressive trmt per family whishes.   IV abx ceftriaxone/azithro  IV Fluid resuscitation       Gastric outlet obstruction  Assessment & Plan  Likely from hiatal hernia, presented w emesis.  NGT decompression   Trend hgb daily  PPI IV     Acute respiratory failure with hypoxia (CMS-HCC)  Assessment & Plan  Due to aspiration pneumonia, due to vomiting from gastric outlet obstruction.      Aspiration pneumonia (CMS-HCC)  Assessment & Plan  Due to vomiting, associated with sepsis and hypoxia.   O2, Ceftriaxone / azithro, NPO, NGT, RT protocol.      SARAH (acute kidney injury) (CMS-HCC)  Assessment & Plan  Due to volume depletion and septic shock  Ivfs     Hypernatremia  Assessment & Plan  Monitor.     Advanced dementia (present on admission)  Assessment & Plan  Advanced , previously on Namenda   NPO due to obstruction .    Hyperglycemia  Assessment & Plan  No known history of DM, likely due to acute illness  SSI prn      Had prolonged Discussion with daughter at bedside - clinical findings, progress , poor prognosis and code status.  Daughter notes she's had poor quality of life and would not want to be on prolonged life sustaining measures  With  significant deterioration and concern for comfort - will change to Comfort care measures only per family wishes- time  spent advanced care planning  15 minutes .     Critical care time spent 37 minutes.   Labs reviewed, Medications reviewed and Radiology images reviewed        DVT Prophylaxis: Heparin      Antibiotics: Treating active infection/contamination beyond 24 hours perioperative coverage          `

## 2017-07-22 NOTE — FLOWSHEET NOTE
07/21/17 2010   Events/Summary/Plan   Non-Invasive Resp Device Site Inspection Completed Intact   Education   Education Yes - Pt. / Family has been Instructed in use of Respiratory Equipment   Nasal Tracheal Suction   #Nasal Tracheal Suction Yes   Chest Exam   Work Of Breathing / Effort Increased Work of Breathing;Tachypnea   Respiration (!) 26   Pulse 96   Breath Sounds   Pre/Post Intervention Post Intervention Assessment   RUL Breath Sounds Coarse Crackles   RML Breath Sounds Coarse Crackles   RLL Breath Sounds Coarse Crackles   JAMES Breath Sounds Coarse Crackles;Diminished   LLL Breath Sounds Coarse Crackles;Diminished   Secretions   Cough Congested;Productive;Weak   How Sputum Obtained Suction;Nasal Tracheal   Sputum Amount Large   Sputum Color Brown   Sputum Consistency Thick   Suction Frequency Suctioned Once or Twice Per Encounter   Oximetry   Continuous Oximetry Yes   Oxygen   Pulse Oximetry 95 %   O2 (LPM) 6   O2 Daily Delivery Respiratory  OxyMask   Events   Rapid Response Smart Text Completed? Yes

## 2017-07-22 NOTE — CARE PLAN
Problem: Safety  Goal: Will remain free from injury  Outcome: PROGRESSING AS EXPECTED    Problem: Respiratory:  Goal: Respiratory status will improve  Outcome: PROGRESSING AS EXPECTED    Problem: Skin Integrity  Goal: Risk for impaired skin integrity will decrease  Outcome: PROGRESSING AS EXPECTED

## 2017-07-22 NOTE — RESPIRATORY CARE
Rapid Response Note:    Rapid Response called on pt who was hypotensive and tacypnic. On arrival pt was found to be on 6 lpm nasal cannula, breath sounds diminished on Left with fine crackles, coarse crackles on right ; pulse Ox 92%. Patient was NT suctioned at the time of rapid and large bile colored secretion was aspirated into Luekens trap and sample sent to lab for sputum culture. CXR completed with showed worsening from previous CXR. Dr. Tracy called and orders received by RN. Will continue to monitor ventilatory status. EKG and labs also completed.

## 2017-07-22 NOTE — FLOWSHEET NOTE
07/21/17 2151   Events/Summary/Plan   Non-Invasive Resp Device Site Inspection Completed Intact   Interdisciplinary Plan of Care-Goals (Indications)   Obstructive Ventilatory Defect or Pulmonary Disease without Obvious Obstruction Physical Exam / Hyperinflation / Wheezing (bronchospasm);PFT / Reduced Airflow;History / Diagnosis   Interdisciplinary Plan of Care-Outcomes    Bronchodilator Outcome Diminished Wheezing and Volume of Air Movement Increased;Improved Vital Signs and Measures of Gas Exchange;Improvement in Airflow (peak flow, PFT)   Education   Education Yes - Pt. / Family has been Instructed in use of Respiratory Equipment;Yes - Pt. / Family has been Instructed in use of Respiratory Medications and Adverse Reactions   RT Assessment of Delivered Medications   Evaluation of Medication Delivery Daily Yes-- Pt /Family has been Instructed in use of Respiratory Medications and Adverse Reactions   SVN Group   #SVN Performed Yes   Given By: Mask   Date SVN Last Changed 07/21/17   Date SVN Next Change Due (Q 7 Days) 07/28/17   Nasal Tracheal Suction   #Nasal Tracheal Suction Yes   Respiratory WDL   Respiratory (WDL) X   Chest Exam   Work Of Breathing / Effort Increased Work of Breathing   Respiration (!) 26   Pulse 96   Breath Sounds   Pre/Post Intervention Pre Intervention Assessment   RUL Breath Sounds Clear   RML Breath Sounds Crackles   RLL Breath Sounds Crackles   JAMES Breath Sounds Clear   LLL Breath Sounds Crackles   Secretions   Cough Congested;Productive;Weak   How Sputum Obtained Spontaneous;Suction   Sputum Amount Small   Sputum Color White;Clear   Sputum Consistency Thick   Oximetry   Continuous Oximetry Yes   Oxygen   Pulse Oximetry 92 %   O2 (LPM) 15   O2 (FiO2) 100   O2 Daily Delivery Respiratory  Non Rebreather Mask

## 2017-07-22 NOTE — FLOWSHEET NOTE
07/22/17 0301   Events/Summary/Plan   Events/Summary/Plan Pt titrated to 12 lpm Oxymask at this time, Pt tolerating well, pt maintaining sats >90%   Respiratory WDL   Respiratory (WDL) X   Breath Sounds   Pre/Post Intervention Pre Intervention Assessment   RUL Breath Sounds Fine Crackles   RML Breath Sounds Fine Crackles   RLL Breath Sounds Fine Crackles   JAMES Breath Sounds Fine Crackles   LLL Breath Sounds Fine Crackles   Oximetry   Continuous Oximetry Yes   Oxygen   Pulse Oximetry 94 %   O2 (LPM) 12   O2 Daily Delivery Respiratory  OxyMask

## 2017-07-23 LAB
BACTERIA SPEC RESP CULT: NORMAL
GRAM STN SPEC: NORMAL
SIGNIFICANT IND 70042: NORMAL
SITE SITE: NORMAL
SOURCE SOURCE: NORMAL

## 2017-07-23 NOTE — DISCHARGE SUMMARY
Hospital Medicine Discharge Note     Admit Date:  7/21/2017       Date of Death:   7/22/2017    Attending Physician:  Kvng Martinez M.D.      Cause of Death    Principal Problem:      Gastric outlet obstruction POA: Unknown    Acute respiratory failure with hypoxia (CMS-HCC) POA: Unknown    Aspiration pneumonia (CMS-HCC) POA: Unknown    Advanced dementia POA: Yes        Hospital Summary (Brief Narrative):         Patient 89-year-old female history of advanced dementia transfered to [facility] with nausea vomiting cough abdominal pain.  She had nausea vomiting with findings of gastric outlet obstruction. Chest x-ray revealed patchy left lung base opacities.  Patient was admitted with septic shock was given fluid resuscitation and empiric antibiotics for aspiration pneumonia.  She declined with worsening response and respiratory failure requiring nonrebreather mask.  She was given NG tube decompression.  Following morning I had a length discussion with the daughter at bedside.  Given her significant comorbidities, poor quality of life recently patient was changed to comfort care after prolonged discussion with daughter.  She is given Roxanol her nonrebreather mask was removed and she passed away.      Consultants:        none    Imaging/ Testing:      DX-CHEST-PORTABLE (1 VIEW)   Final Result      1.  NG tube projects over the right lower hemithorax probably within a distended stomach as discussed above.   2.  There has been interval development of diffuse pulmonary edema with small effusions.      CT-ABDOMEN-PELVIS W/O   Final Result         1. 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 A component of volvulus cannot be entirely excluded.      2. Atrophic kidneys. Nonobstructive left renal calculi. Large left kidney cyst.      3. Patchy left base opacities, atelectasis versus consolidation.      DX-CHEST-PORTABLE (1 VIEW)   Final Result         1. No pulmonary infiltrates or  consolidations are noted.              Total time spent with family: 37  minutes    #################################################

## 2017-07-26 LAB
BACTERIA BLD CULT: NORMAL
BACTERIA BLD CULT: NORMAL
EKG IMPRESSION: NORMAL
SIGNIFICANT IND 70042: NORMAL
SIGNIFICANT IND 70042: NORMAL
SITE SITE: NORMAL
SITE SITE: NORMAL
SOURCE SOURCE: NORMAL
SOURCE SOURCE: NORMAL

## 2017-08-05 NOTE — CARE PLAN
Problem: Safety  Goal: Will remain free from injury  Outcome: PROGRESSING AS EXPECTED  PT WILL BE FREE FROM INJURY, INSTRUCTION FOR USE OF CALL/BR CALL LIGHT GIVEN.    Problem: Knowledge Deficit  Goal: Knowledge of disease process/condition, treatment plan, diagnostic tests, and medications will improve  Outcome: PROGRESSING AS EXPECTED  PT AND FAMILY MEMBER  UPDATED ON PTS PLAN OF CARE,NURSES COMMUNICATIONS WITH DOCTORS.         decreased step length/decreased jeanie

## 2017-11-15 NOTE — ADDENDUM NOTE
Encounter addended by: Ximena Howard on: 11/15/2017  2:12 PM<BR>    Actions taken: Flowsheet accepted
